# Patient Record
Sex: FEMALE | Race: WHITE | NOT HISPANIC OR LATINO | Employment: STUDENT | RURAL
[De-identification: names, ages, dates, MRNs, and addresses within clinical notes are randomized per-mention and may not be internally consistent; named-entity substitution may affect disease eponyms.]

---
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
## 2019-06-12 ENCOUNTER — OFFICE VISIT (OUTPATIENT)
Dept: OBSTETRICS AND GYNECOLOGY | Facility: CLINIC | Age: 19
End: 2019-06-12

## 2019-06-12 ENCOUNTER — APPOINTMENT (OUTPATIENT)
Dept: LAB | Facility: HOSPITAL | Age: 19
End: 2019-06-12

## 2019-06-12 VITALS
WEIGHT: 112 LBS | DIASTOLIC BLOOD PRESSURE: 62 MMHG | SYSTOLIC BLOOD PRESSURE: 112 MMHG | BODY MASS INDEX: 18.66 KG/M2 | HEIGHT: 65 IN

## 2019-06-12 DIAGNOSIS — N93.9 ABNORMAL UTERINE BLEEDING (AUB): Primary | ICD-10-CM

## 2019-06-12 PROCEDURE — 36415 COLL VENOUS BLD VENIPUNCTURE: CPT | Performed by: NURSE PRACTITIONER

## 2019-06-12 PROCEDURE — 84439 ASSAY OF FREE THYROXINE: CPT | Performed by: NURSE PRACTITIONER

## 2019-06-12 PROCEDURE — 84443 ASSAY THYROID STIM HORMONE: CPT | Performed by: NURSE PRACTITIONER

## 2019-06-12 PROCEDURE — 99203 OFFICE O/P NEW LOW 30 MIN: CPT | Performed by: NURSE PRACTITIONER

## 2019-06-12 NOTE — PROGRESS NOTES
"Paz Maldonado is a 18 y.o. 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  She has been under a lot of stress the past few months with college and trying to find a place to live with her boyfriend.  She has not experienced any further episodes of bleeding since the March episode.       Vaginal Bleeding   The patient's primary symptoms include vaginal bleeding. The patient's pertinent negatives include no genital itching, genital lesions, genital odor, genital rash, missed menses, pelvic pain or vaginal discharge. This is a new problem. The current episode started more than 1 month ago. The problem has been resolved. The patient is experiencing no pain. She is not pregnant. Pertinent negatives include no abdominal pain, anorexia, back pain, chills, constipation, diarrhea, discolored urine, dysuria, fever, flank pain, frequency, headaches, hematuria, joint pain, joint swelling, nausea, painful intercourse, rash, sore throat, urgency or vomiting. The vaginal discharge was brown, bloody and thin. She has been passing clots. She has not been passing tissue. Nothing aggravates the symptoms. She has tried NSAIDs for the symptoms. The treatment provided moderate relief. She is sexually active. No, her partner does not have an STD. She uses progestin injections for contraception. Her menstrual history has been irregular. Her past medical history is significant for menorrhagia and an STD. There is no history of an abdominal surgery, a  section, an ectopic pregnancy, endometriosis, a gynecological surgery, herpes simplex, metrorrhagia, miscarriage, ovarian cysts, perineal abscess, PID, a terminated pregnancy or vaginosis.       The following portions of the patient's history were " reviewed and updated as appropriate: allergies, current medications, past family history, past medical history, past social history, past surgical history and problem list.    Review of Systems   Constitutional: Negative for chills, fatigue, fever and unexpected weight change.   HENT: Negative for sore throat.    Respiratory: Negative for apnea, chest tightness and shortness of breath.    Cardiovascular: Negative for chest pain and palpitations.   Gastrointestinal: Negative for abdominal pain, anorexia, constipation, diarrhea, nausea and vomiting.   Genitourinary: Positive for menorrhagia. Negative for difficulty urinating, dyspareunia, dysuria, enuresis, flank pain, frequency, genital sores, hematuria, menstrual problem, missed menses, pelvic pain, urgency, vaginal bleeding, vaginal discharge and vaginal pain.   Musculoskeletal: Negative for back pain and joint pain.   Skin: Negative for rash.   Neurological: Negative for headaches.   Psychiatric/Behavioral: Negative for sleep disturbance.       Objective   Physical Exam   Constitutional: She is oriented to person, place, and time. Vital signs are normal. She appears well-developed and well-nourished. No distress.   Cardiovascular: Normal rate, regular rhythm and normal heart sounds.   Pulmonary/Chest: Effort normal and breath sounds normal.   Abdominal: Soft. Normal appearance and bowel sounds are normal. There is no tenderness.   Neurological: She is alert and oriented to person, place, and time. GCS eye subscore is 4. GCS verbal subscore is 5. GCS motor subscore is 6.   Skin: Skin is warm, dry and intact. She is not diaphoretic.   Psychiatric: She has a normal mood and affect. Her behavior is normal.   Nursing note and vitals reviewed.        Assessment/Plan   Diagnoses and all orders for this visit:    Abnormal uterine bleeding (AUB)  -     T4, Free  -     TSH  -     US Non-ob Transvaginal; Future      Labs to r/o thyroid disease.  TVUS to r/o anatomical  abnormality.  We discussed reasons for bleeding while on Depo-provera.  I think her increased stress level may have a lot to do with her bleeding while on injections.  Reviewed preliminary scan report with pt: uterus normal size, bicornuate uterus, endometrium 5.2mm, bilateral ovaries wnl with color flow present.   RTC if symptoms do not improve or worsen.

## 2019-06-13 LAB
T4 FREE SERPL-MCNC: 1.66 NG/DL (ref 0.93–1.7)
TSH SERPL DL<=0.05 MIU/L-ACNC: 1.22 MIU/ML (ref 0.27–4.2)

## 2021-10-01 ENCOUNTER — OFFICE VISIT (OUTPATIENT)
Dept: OBSTETRICS AND GYNECOLOGY | Facility: CLINIC | Age: 21
End: 2021-10-01

## 2021-10-01 VITALS
HEIGHT: 65 IN | WEIGHT: 120 LBS | DIASTOLIC BLOOD PRESSURE: 62 MMHG | BODY MASS INDEX: 19.99 KG/M2 | SYSTOLIC BLOOD PRESSURE: 100 MMHG

## 2021-10-01 DIAGNOSIS — N94.6 DYSMENORRHEA: Primary | ICD-10-CM

## 2021-10-01 DIAGNOSIS — N89.8 VAGINAL IRRITATION: ICD-10-CM

## 2021-10-01 LAB
CANDIDA ALBICANS: NEGATIVE
GARDNERELLA VAGINALIS: NEGATIVE
T VAGINALIS DNA VAG QL PROBE+SIG AMP: NEGATIVE

## 2021-10-01 PROCEDURE — 87660 TRICHOMONAS VAGIN DIR PROBE: CPT | Performed by: NURSE PRACTITIONER

## 2021-10-01 PROCEDURE — 87591 N.GONORRHOEAE DNA AMP PROB: CPT | Performed by: NURSE PRACTITIONER

## 2021-10-01 PROCEDURE — 99213 OFFICE O/P EST LOW 20 MIN: CPT | Performed by: NURSE PRACTITIONER

## 2021-10-01 PROCEDURE — 87491 CHLMYD TRACH DNA AMP PROBE: CPT | Performed by: NURSE PRACTITIONER

## 2021-10-01 PROCEDURE — 87480 CANDIDA DNA DIR PROBE: CPT | Performed by: NURSE PRACTITIONER

## 2021-10-01 PROCEDURE — 87661 TRICHOMONAS VAGINALIS AMPLIF: CPT | Performed by: NURSE PRACTITIONER

## 2021-10-01 PROCEDURE — 87510 GARDNER VAG DNA DIR PROBE: CPT | Performed by: NURSE PRACTITIONER

## 2021-10-01 RX ORDER — IBUPROFEN 800 MG/1
TABLET ORAL
Qty: 30 TABLET | Refills: 5 | Status: SHIPPED | OUTPATIENT
Start: 2021-10-01 | End: 2023-03-02

## 2021-10-01 NOTE — PROGRESS NOTES
Subjective   Jennifer Maldonado is a 21 y.o. here for painful periods    Jennifer Maldonado is a 21 yr old  who presents with concerns for painful periods. Pt has 2 uterus and 2 cervix (diagnosed at 15 yrs old) and had previously been on Depo-Provera to manage her painful periods. Pt had been off Depo-Provera since 2020 and periods are back to being very painful. Does not want to be on birth control but just wants to make sure there isn't anything else wrong. She takes midol and ibuprofen when she starts her period but sometimes does not feel the medications help. Denies pelvic pain when not on her period and denies dyspareunia. Jennifer also c/o of vaginal discharge and irritation prior to starting her period 2 days ago; wants to be swabbed. Is sexually active, uses condoms.       The following portions of the patient's history were reviewed and updated as appropriate: allergies, current medications, past family history, past medical history, past social history, past surgical history and problem list.    Review of Systems   Constitutional: Negative for chills, fatigue and fever.   Respiratory: Negative for shortness of breath.    Cardiovascular: Negative for chest pain and palpitations.   Gastrointestinal: Negative for abdominal pain, constipation, diarrhea and nausea.   Genitourinary: Positive for menstrual problem. Negative for dysuria, frequency, pelvic pressure, vaginal bleeding, vaginal discharge and vaginal pain.   Skin: Negative for rash.   Neurological: Negative for headache.   Psychiatric/Behavioral: Negative for depressed mood.       Objective   Physical Exam  Vitals and nursing note reviewed. Exam conducted with a chaperone present.   Constitutional:       Appearance: She is well-developed.   HENT:      Head: Normocephalic.   Cardiovascular:      Rate and Rhythm: Normal rate and regular rhythm.   Pulmonary:      Effort: Pulmonary effort is normal.   Genitourinary:     General: Normal vulva.      Labia:          Right: No rash, tenderness, lesion or injury.         Left: No rash, tenderness, lesion or injury.       Vagina: Normal.      Cervix: Normal.      Comments: 2 cervix visualized. Small amount of menstrual blood.  Vag panel and G/C collected.   Musculoskeletal:         General: Normal range of motion.      Cervical back: Normal range of motion.   Skin:     General: Skin is warm and dry.   Neurological:      Mental Status: She is alert and oriented to person, place, and time.   Psychiatric:         Behavior: Behavior normal.           Assessment/Plan   Diagnoses and all orders for this visit:    1. Dysmenorrhea (Primary)    2. Vaginal irritation  -     Gardnerella vaginalis, Trichomonas vaginalis, Candida albicans, DNA - Swab, Vagina  -     Chlamydia trachomatis, Neisseria gonorrhoeae, Trichomonas vaginalis, PCR - Swab, Cervix; Future  -     Chlamydia trachomatis, Neisseria gonorrhoeae, Trichomonas vaginalis, PCR - Swab, Cervix    Other orders  -     ibuprofen (ADVIL,MOTRIN) 800 MG tablet; Take 1 pill every 8 hours two days prior to start of period; continue for 2-3 days.  Dispense: 30 tablet; Refill: 5        Since patient does not want birth control to managed dysmenorrhea, discussed start taking ibuprofen 2 days prior to onset of period to better manage the pain. Take medication with food and water to avoid GI upset. Pt is agreeable to this plan. Offered pelvic ultrasound for further evaluation; pt declines at this time.  Will call pt with abnormal results of swabs.  Return for well woman/pap when not on her period.

## 2021-10-02 LAB
C TRACH RRNA CVX QL NAA+PROBE: NEGATIVE
N GONORRHOEA RRNA SPEC QL NAA+PROBE: NEGATIVE
TRICHOMONAS VAGINALIS PCR: NEGATIVE

## 2021-10-12 ENCOUNTER — OFFICE VISIT (OUTPATIENT)
Dept: OBSTETRICS AND GYNECOLOGY | Facility: CLINIC | Age: 21
End: 2021-10-12

## 2021-10-12 VITALS
WEIGHT: 120 LBS | DIASTOLIC BLOOD PRESSURE: 62 MMHG | HEIGHT: 65 IN | SYSTOLIC BLOOD PRESSURE: 94 MMHG | BODY MASS INDEX: 19.99 KG/M2

## 2021-10-12 DIAGNOSIS — N89.8 VAGINAL ODOR: ICD-10-CM

## 2021-10-12 DIAGNOSIS — Z01.419 WELL WOMAN EXAM WITH ROUTINE GYNECOLOGICAL EXAM: Primary | ICD-10-CM

## 2021-10-12 DIAGNOSIS — Z12.4 ENCOUNTER FOR PAPANICOLAOU SMEAR FOR CERVICAL CANCER SCREENING: ICD-10-CM

## 2021-10-12 PROCEDURE — 87510 GARDNER VAG DNA DIR PROBE: CPT | Performed by: NURSE PRACTITIONER

## 2021-10-12 PROCEDURE — 87480 CANDIDA DNA DIR PROBE: CPT | Performed by: NURSE PRACTITIONER

## 2021-10-12 PROCEDURE — 87660 TRICHOMONAS VAGIN DIR PROBE: CPT | Performed by: NURSE PRACTITIONER

## 2021-10-12 PROCEDURE — 99395 PREV VISIT EST AGE 18-39: CPT | Performed by: NURSE PRACTITIONER

## 2021-10-12 NOTE — PROGRESS NOTES
Subjective   Jennifer Maldonado is a 21 y.o. [facility] well woman visit    Jennifer Maldonado is a 21 yr old  premenopasual female, diagnosed with 2 uterus and 2 cervix at age 15. C/o of vaginal odor that is unresolved from her last visit. LMP 2021.     Gynecologic Exam  The patient's primary symptoms include a genital odor. The patient's pertinent negatives include no genital itching, genital lesions, genital rash, missed menses, pelvic pain, vaginal bleeding or vaginal discharge. This is a recurrent problem. The current episode started in the past 7 days. Pertinent negatives include no abdominal pain, chills, constipation, diarrhea, dysuria, fever, frequency, nausea, rash or sore throat. She is sexually active. No, her partner does not have an STD. She uses nothing for contraception. Her menstrual history has been regular.       The following portions of the patient's history were reviewed and updated as appropriate: allergies, current medications, past family history, past medical history, past social history, past surgical history and problem list.    Review of Systems   Constitutional: Negative for chills, fatigue, fever, unexpected weight gain and unexpected weight loss.   HENT: Negative for sneezing and sore throat.    Respiratory: Negative for shortness of breath.    Cardiovascular: Negative for chest pain and palpitations.   Gastrointestinal: Negative for abdominal pain, constipation, diarrhea and nausea.   Endocrine: Negative for cold intolerance and heat intolerance.   Genitourinary: Negative for amenorrhea, breast discharge, breast lump, breast pain, difficulty urinating, dysuria, frequency, menstrual problem, missed menses, pelvic pain, pelvic pressure, urinary incontinence, vaginal bleeding, vaginal discharge and vaginal pain.        Vaginal odor   Skin: Negative for rash.   Neurological: Negative for weakness and headache.   Psychiatric/Behavioral: Negative for sleep disturbance, depressed mood and  stress.       Objective   Physical Exam  Vitals and nursing note reviewed. Exam conducted with a chaperone present.   Constitutional:       Appearance: She is well-developed.   HENT:      Head: Normocephalic and atraumatic.   Eyes:      Conjunctiva/sclera: Conjunctivae normal.   Neck:      Thyroid: No thyromegaly.   Cardiovascular:      Rate and Rhythm: Normal rate and regular rhythm.      Heart sounds: Normal heart sounds.   Pulmonary:      Effort: Pulmonary effort is normal. No respiratory distress.      Breath sounds: Normal breath sounds.   Chest:   Breasts:      Right: Normal.      Left: Normal.        Comments: Bilateral nipple piercings.   Abdominal:      General: Bowel sounds are normal. There is no distension.      Palpations: Abdomen is soft.      Tenderness: There is no abdominal tenderness.   Genitourinary:     General: Normal vulva.      Labia:         Right: No rash, tenderness or lesion.         Left: No rash, tenderness, lesion or injury.       Vagina: Normal.      Cervix: Normal.      Rectum: Normal.      Comments: Pt has 2 cervix; left cervix appear to be smaller. Pap smears collected and labeled appropriately.     Vag panel collected.   Musculoskeletal:         General: Normal range of motion.      Cervical back: Normal range of motion and neck supple.   Skin:     General: Skin is warm and dry.   Neurological:      Mental Status: She is alert and oriented to person, place, and time.   Psychiatric:         Behavior: Behavior normal.           Assessment/Plan   Diagnoses and all orders for this visit:    1. Well woman exam with routine gynecological exam (Primary)    2. Encounter for Papanicolaou smear for cervical cancer screening  -     Liquid-based Pap Smear, Screening    3. Vaginal odor  -     Gardnerella vaginalis, Trichomonas vaginalis, Candida albicans, DNA - Swab, Vagina        Reviewed pap smear screening guidelines, breast awareness. Discussed that she does not have excessive vaginal  discharge or odor; will call if vag panel shows anything.  Briefly reviewed BC options to include pills, patch, and Depo-Provera for suppression of period as pt has dysmenorrhea; pt declines again at this time. Return in one year or as needed.

## 2021-10-14 ENCOUNTER — TELEPHONE (OUTPATIENT)
Dept: OBSTETRICS AND GYNECOLOGY | Facility: CLINIC | Age: 21
End: 2021-10-14

## 2021-10-14 LAB
LAB AP CASE REPORT: NORMAL
LAB AP CASE REPORT: NORMAL
PATH INTERP SPEC-IMP: NORMAL
PATH INTERP SPEC-IMP: NORMAL

## 2021-10-14 RX ORDER — FLUCONAZOLE 150 MG/1
150 TABLET ORAL DAILY
Qty: 1 TABLET | Refills: 0 | Status: SHIPPED | OUTPATIENT
Start: 2021-10-14 | End: 2023-03-02

## 2021-10-14 NOTE — TELEPHONE ENCOUNTER
----- Message from MOHINI Castano sent at 10/14/2021 12:25 PM CDT -----  Please let her know that her pap smears came back normal. One said she had yeast; if she wants treatment for this please order Diflucan one time dose. Thank you.

## 2023-03-02 ENCOUNTER — LAB (OUTPATIENT)
Dept: LAB | Facility: HOSPITAL | Age: 23
End: 2023-03-02
Payer: COMMERCIAL

## 2023-03-02 ENCOUNTER — OFFICE VISIT (OUTPATIENT)
Dept: FAMILY MEDICINE CLINIC | Facility: CLINIC | Age: 23
End: 2023-03-02
Payer: COMMERCIAL

## 2023-03-02 VITALS
WEIGHT: 108 LBS | SYSTOLIC BLOOD PRESSURE: 128 MMHG | HEART RATE: 93 BPM | OXYGEN SATURATION: 100 % | HEIGHT: 65 IN | BODY MASS INDEX: 17.99 KG/M2 | DIASTOLIC BLOOD PRESSURE: 56 MMHG

## 2023-03-02 DIAGNOSIS — N63.0 BREAST MASS IN FEMALE: ICD-10-CM

## 2023-03-02 DIAGNOSIS — N64.4 BREAST TENDERNESS IN FEMALE: ICD-10-CM

## 2023-03-02 DIAGNOSIS — Z00.00 ANNUAL PHYSICAL EXAM: ICD-10-CM

## 2023-03-02 DIAGNOSIS — Z00.00 ANNUAL PHYSICAL EXAM: Primary | ICD-10-CM

## 2023-03-02 DIAGNOSIS — Z76.89 ENCOUNTER TO ESTABLISH CARE: ICD-10-CM

## 2023-03-02 LAB
ALBUMIN SERPL-MCNC: 4.6 G/DL (ref 3.5–5.2)
ALBUMIN/GLOB SERPL: 1.5 G/DL
ALP SERPL-CCNC: 64 U/L (ref 39–117)
ALT SERPL W P-5'-P-CCNC: 9 U/L (ref 1–33)
ANION GAP SERPL CALCULATED.3IONS-SCNC: 8.8 MMOL/L (ref 5–15)
AST SERPL-CCNC: 13 U/L (ref 1–32)
BASOPHILS # BLD AUTO: 0.06 10*3/MM3 (ref 0–0.2)
BASOPHILS NFR BLD AUTO: 0.9 % (ref 0–1.5)
BILIRUB SERPL-MCNC: 0.3 MG/DL (ref 0–1.2)
BUN SERPL-MCNC: 7 MG/DL (ref 6–20)
BUN/CREAT SERPL: 9.7 (ref 7–25)
CALCIUM SPEC-SCNC: 9.4 MG/DL (ref 8.6–10.5)
CHLORIDE SERPL-SCNC: 103 MMOL/L (ref 98–107)
CO2 SERPL-SCNC: 26.2 MMOL/L (ref 22–29)
CREAT SERPL-MCNC: 0.72 MG/DL (ref 0.57–1)
DEPRECATED RDW RBC AUTO: 41 FL (ref 37–54)
EGFRCR SERPLBLD CKD-EPI 2021: 120.7 ML/MIN/1.73
EOSINOPHIL # BLD AUTO: 0.08 10*3/MM3 (ref 0–0.4)
EOSINOPHIL NFR BLD AUTO: 1.2 % (ref 0.3–6.2)
ERYTHROCYTE [DISTWIDTH] IN BLOOD BY AUTOMATED COUNT: 12.7 % (ref 12.3–15.4)
GLOBULIN UR ELPH-MCNC: 3 GM/DL
GLUCOSE SERPL-MCNC: 81 MG/DL (ref 65–99)
HBA1C MFR BLD: 5 % (ref 4.8–5.6)
HCT VFR BLD AUTO: 40.1 % (ref 34–46.6)
HCV AB SER DONR QL: NORMAL
HGB BLD-MCNC: 14 G/DL (ref 12–15.9)
IMM GRANULOCYTES # BLD AUTO: 0.02 10*3/MM3 (ref 0–0.05)
IMM GRANULOCYTES NFR BLD AUTO: 0.3 % (ref 0–0.5)
LYMPHOCYTES # BLD AUTO: 1.96 10*3/MM3 (ref 0.7–3.1)
LYMPHOCYTES NFR BLD AUTO: 28.9 % (ref 19.6–45.3)
MCH RBC QN AUTO: 30.9 PG (ref 26.6–33)
MCHC RBC AUTO-ENTMCNC: 34.9 G/DL (ref 31.5–35.7)
MCV RBC AUTO: 88.5 FL (ref 79–97)
MONOCYTES # BLD AUTO: 0.51 10*3/MM3 (ref 0.1–0.9)
MONOCYTES NFR BLD AUTO: 7.5 % (ref 5–12)
NEUTROPHILS NFR BLD AUTO: 4.15 10*3/MM3 (ref 1.7–7)
NEUTROPHILS NFR BLD AUTO: 61.2 % (ref 42.7–76)
NRBC BLD AUTO-RTO: 0 /100 WBC (ref 0–0.2)
PLATELET # BLD AUTO: 307 10*3/MM3 (ref 140–450)
PMV BLD AUTO: 11.1 FL (ref 6–12)
POTASSIUM SERPL-SCNC: 4.1 MMOL/L (ref 3.5–5.2)
PROT SERPL-MCNC: 7.6 G/DL (ref 6–8.5)
RBC # BLD AUTO: 4.53 10*6/MM3 (ref 3.77–5.28)
SODIUM SERPL-SCNC: 138 MMOL/L (ref 136–145)
TSH SERPL DL<=0.05 MIU/L-ACNC: 1.43 UIU/ML (ref 0.27–4.2)
VIT B12 BLD-MCNC: 438 PG/ML (ref 211–946)
WBC NRBC COR # BLD: 6.78 10*3/MM3 (ref 3.4–10.8)

## 2023-03-02 PROCEDURE — 36415 COLL VENOUS BLD VENIPUNCTURE: CPT

## 2023-03-02 PROCEDURE — 86803 HEPATITIS C AB TEST: CPT

## 2023-03-02 PROCEDURE — 83036 HEMOGLOBIN GLYCOSYLATED A1C: CPT

## 2023-03-02 PROCEDURE — 99395 PREV VISIT EST AGE 18-39: CPT | Performed by: NURSE PRACTITIONER

## 2023-03-02 PROCEDURE — 80050 GENERAL HEALTH PANEL: CPT

## 2023-03-02 PROCEDURE — 82607 VITAMIN B-12: CPT

## 2023-03-02 NOTE — PROGRESS NOTES
"Chief Complaint  Establish Care and Pain (Right breast )    Subjective          Jennifer Maldonado presents to [facility] - [place] encounter to establish care.   Had a mammogram last year which read abnormal and feels like she needs an ultrasound. She was told by her last PCP that she didn't need an US.   Breast Pain  This is a new problem. The current episode started 1 to 4 weeks ago. The problem has been unchanged. Associated symptoms comments: Right lump.     Outpatient Medications Prior to Visit   Medication Sig Dispense Refill   • fluconazole (Diflucan) 150 MG tablet Take 1 tablet by mouth Daily. 1 tablet 0   • ibuprofen (ADVIL,MOTRIN) 800 MG tablet Take 1 pill every 8 hours two days prior to start of period; continue for 2-3 days. 30 tablet 5     No facility-administered medications prior to visit.       Review of Systems      Objective   Vital Signs:   Visit Vitals  /56 (BP Location: Left arm, Patient Position: Sitting, Cuff Size: Adult)   Pulse 93   Ht 165.1 cm (65\")   Wt 49 kg (108 lb)   LMP 02/06/2023 (Exact Date)   SpO2 100%   BMI 17.97 kg/m²     Physical Exam  Vitals and nursing note reviewed.   Constitutional:       Appearance: She is well-developed.   HENT:      Head: Normocephalic and atraumatic.   Eyes:      General: Lids are normal.      Conjunctiva/sclera: Conjunctivae normal.   Neck:      Thyroid: No thyroid mass or thyromegaly.      Trachea: Trachea normal. No tracheal tenderness.   Cardiovascular:      Rate and Rhythm: Normal rate.      Pulses: Normal pulses.      Heart sounds: Normal heart sounds.   Pulmonary:      Effort: Pulmonary effort is normal. No respiratory distress.      Breath sounds: Normal breath sounds. No wheezing.   Chest:   Breasts:     Right: Tenderness present.       Abdominal:      General: There is no distension.      Palpations: Abdomen is soft. There is no mass.   Musculoskeletal:         General: Normal range of motion.      " Cervical back: Normal range of motion. No edema.   Skin:     General: Skin is warm and dry.      Coloration: Skin is not pale.      Findings: No abrasion, erythema or lesion.   Neurological:      Mental Status: She is alert and oriented to person, place, and time.   Psychiatric:         Mood and Affect: Mood is anxious. Affect is not inappropriate.         Speech: Speech normal.         Behavior: Behavior normal.         Thought Content: Thought content normal.         Judgment: Judgment normal. Judgment is not impulsive.        Result Review :                 Assessment and Plan    Diagnoses and all orders for this visit:    1. Annual physical exam (Primary)  -     TSH; Future  -     Comprehensive Metabolic Panel; Future  -     Hemoglobin A1c; Future  -     CBC & Differential; Future  -     Vitamin B12; Future  -     Hepatitis C Antibody; Future    2. Encounter to establish care  -     TSH; Future  -     Comprehensive Metabolic Panel; Future  -     Hemoglobin A1c; Future  -     CBC & Differential; Future  -     Vitamin B12; Future  -     Hepatitis C Antibody; Future    3. Breast tenderness in female  -     US breast right complete; Future  -     TSH; Future  -     Comprehensive Metabolic Panel; Future  -     Hemoglobin A1c; Future  -     CBC & Differential; Future  -     Vitamin B12; Future  -     Hepatitis C Antibody; Future    4. Breast mass in female  -     US breast right complete; Future  -     TSH; Future  -     Comprehensive Metabolic Panel; Future  -     Hemoglobin A1c; Future  -     CBC & Differential; Future  -     Vitamin B12; Future  -     Hepatitis C Antibody; Future      Complete ordered lab work   We will call with results  Pending lab results we will discuss further treatment plan and monitoring     US ordered- they will call to schedule   We will call with results    Please call the office if you have any issues       Follow Up   Return in about 1 year (around 3/2/2024), or if symptoms worsen or  fail to improve, for Annual physical.  Patient was given instructions and counseling regarding her condition or for health maintenance advice. Please see specific information pulled into the AVS if appropriate.           This document has been electronically signed by MOHINI Aguilar on March 3, 2023 09:30 CST

## 2023-03-20 ENCOUNTER — TELEPHONE (OUTPATIENT)
Dept: OBGYN CLINIC | Age: 23
End: 2023-03-20

## 2023-03-20 NOTE — TELEPHONE ENCOUNTER
Patient is scheduled for a new patient pregnancy confirmation on 3/31. Patient stated she was experiencing cramping and spotting. Informed patientthat per Kimberlee Fisher it was recommended she be evaluated in ER or by PCP since we had not established care yet.     TONIA Angel

## 2023-03-24 ENCOUNTER — TELEPHONE (OUTPATIENT)
Dept: FAMILY MEDICINE CLINIC | Facility: CLINIC | Age: 23
End: 2023-03-24
Payer: COMMERCIAL

## 2023-03-24 ENCOUNTER — OFFICE VISIT (OUTPATIENT)
Dept: SURGERY | Facility: CLINIC | Age: 23
End: 2023-03-24
Payer: COMMERCIAL

## 2023-03-24 VITALS
HEIGHT: 65 IN | HEART RATE: 66 BPM | SYSTOLIC BLOOD PRESSURE: 110 MMHG | TEMPERATURE: 96.8 F | DIASTOLIC BLOOD PRESSURE: 60 MMHG | BODY MASS INDEX: 18.83 KG/M2 | WEIGHT: 113 LBS

## 2023-03-24 DIAGNOSIS — N63.11 MASS OF UPPER OUTER QUADRANT OF RIGHT BREAST: Primary | ICD-10-CM

## 2023-03-24 PROCEDURE — 99204 OFFICE O/P NEW MOD 45 MIN: CPT | Performed by: SURGERY

## 2023-03-24 RX ORDER — DIAZEPAM 5 MG/1
5 TABLET ORAL ONCE
Status: CANCELLED | OUTPATIENT
Start: 2023-03-24 | End: 2023-03-24

## 2023-03-24 RX ORDER — LIDOCAINE HYDROCHLORIDE AND EPINEPHRINE 10; 10 MG/ML; UG/ML
20 INJECTION, SOLUTION INFILTRATION; PERINEURAL ONCE
Status: CANCELLED | OUTPATIENT
Start: 2023-03-24 | End: 2023-03-24

## 2023-03-24 NOTE — PROGRESS NOTES
Paz Maldonado is a 22 y.o. female     Chief Complaint: Right breast mass    History of Present Illness presents with a palpable mass in the 12 o'clock position in the right breast.  Patient initially noticed this mass which she describes as pea-sized back in August of last year.  She states that she had a mammogram done at [facility] and they told her breast was very dense and she need to have further studies done.  That report is not available to us or the films at this time.  Patient recently found out she was pregnant about 3 weeks ago on a home pregnancy test.  She has not confirmed the pregnancy as of yet but she does note that her breasts seem to be swelling more.  She has no complaints about her breast.  This pea-sized mass is unchanged and she had an ultrasound done this morning which demonstrated an 8 x 15 x 13 mm solid mass at the palpable abnormality.  No family history of breast cancer no history of breast trauma.  No nipple discharge.  Mildly tender at the site but no other symptoms    Review of Systems   Constitutional: Negative.    Eyes: Negative.    Respiratory: Negative.    Cardiovascular: Negative.    Gastrointestinal: Negative.    Endocrine: Negative.    Genitourinary: Negative.    Musculoskeletal: Negative.    Skin: Negative.    Allergic/Immunologic: Negative.    Neurological: Positive for headaches.   Hematological: Negative.    Psychiatric/Behavioral: Negative.      Past Medical History:   Diagnosis Date   • Headache      No past surgical history on file.  Family History   Problem Relation Age of Onset   • No Known Problems Mother    • Heart failure Father    • Hyperlipidemia Father    • Hypertension Father    • No Known Problems Sister    • No Known Problems Brother    • COPD Maternal Grandmother    • No Known Problems Maternal Grandfather    • Cancer Paternal Grandmother    • Hyperlipidemia Paternal Grandfather    • Hypertension Paternal Grandfather    • No Known Problems  Brother      Social History     Socioeconomic History   • Marital status: Single   Tobacco Use   • Smoking status: Never   • Smokeless tobacco: Never   Vaping Use   • Vaping Use: Former   • Quit date: 3/3/2023   • Substances: Nicotine   • Devices: Disposable   Substance and Sexual Activity   • Alcohol use: No   • Drug use: No   • Sexual activity: Defer     No Known Allergies  Vitals:    03/24/23 1320   BP: 110/60   Pulse: 66   Temp: 96.8 °F (36 °C)       Home Medications:  Prior to Admission medications    Not on File       Objective   Physical Exam  Constitutional:       General: She is not in acute distress.     Appearance: She is well-developed.   HENT:      Head: Normocephalic and atraumatic.   Eyes:      General: No scleral icterus.     Conjunctiva/sclera: Conjunctivae normal.   Neck:      Thyroid: No thyromegaly.      Trachea: No tracheal deviation.   Cardiovascular:      Rate and Rhythm: Normal rate and regular rhythm.      Heart sounds: Normal heart sounds. No murmur heard.    No friction rub. No gallop.   Pulmonary:      Effort: Pulmonary effort is normal. No respiratory distress.      Breath sounds: Normal breath sounds. No stridor. No wheezing or rales.   Chest:      Chest wall: No tenderness.       Abdominal:      General: Bowel sounds are normal. There is no distension.      Palpations: Abdomen is soft. There is no mass.      Tenderness: There is no abdominal tenderness. There is no guarding or rebound.      Hernia: No hernia is present.   Musculoskeletal:         General: No deformity. Normal range of motion.      Cervical back: Normal range of motion and neck supple.   Lymphadenopathy:      Cervical: No cervical adenopathy.   Skin:     General: Skin is warm and dry.      Coloration: Skin is not pale.      Findings: No erythema or rash.      Nails: There is no clubbing.   Neurological:      Mental Status: She is alert and oriented to person, place, and time.   Psychiatric:         Behavior: Behavior  normal.         Thought Content: Thought content normal.     Reviewed ultrasound with the patient answered all of her questions    Assessment & Plan Based upon her history this is probably a benign finding most likely some type of fibroadenoma and I explained that to her.  I would recommend biopsy though to confirm this but she clearly understands her options.  Biopsy can be done under just local.  No other medication to be given and I think that would be safe for both her and her baby.  She is going to do her OB care in [place].  We will set her up for a right breast ultrasound mammotome biopsy and leave a clip.  I fully discussed the procedure alternatives risk and benefits with her.  We will not give her Valium.      There were no encounter diagnoses.                     This document has been electronically signed by Manjula Diaz CSA on March 24, 2023 13:26 CDT

## 2023-03-24 NOTE — TELEPHONE ENCOUNTER
Della with [facility] called needing conformation that the patients breast ultarsound results were received. Please call [phone]

## 2023-03-24 NOTE — H&P (VIEW-ONLY)
Paz Maldonado is a 22 y.o. female     Chief Complaint: Right breast mass    History of Present Illness presents with a palpable mass in the 12 o'clock position in the right breast.  Patient initially noticed this mass which she describes as pea-sized back in August of last year.  She states that she had a mammogram done at [facility] and they told her breast was very dense and she need to have further studies done.  That report is not available to us or the films at this time.  Patient recently found out she was pregnant about 3 weeks ago on a home pregnancy test.  She has not confirmed the pregnancy as of yet but she does note that her breasts seem to be swelling more.  She has no complaints about her breast.  This pea-sized mass is unchanged and she had an ultrasound done this morning which demonstrated an 8 x 15 x 13 mm solid mass at the palpable abnormality.  No family history of breast cancer no history of breast trauma.  No nipple discharge.  Mildly tender at the site but no other symptoms    Review of Systems   Constitutional: Negative.    Eyes: Negative.    Respiratory: Negative.    Cardiovascular: Negative.    Gastrointestinal: Negative.    Endocrine: Negative.    Genitourinary: Negative.    Musculoskeletal: Negative.    Skin: Negative.    Allergic/Immunologic: Negative.    Neurological: Positive for headaches.   Hematological: Negative.    Psychiatric/Behavioral: Negative.      Past Medical History:   Diagnosis Date   • Headache      No past surgical history on file.  Family History   Problem Relation Age of Onset   • No Known Problems Mother    • Heart failure Father    • Hyperlipidemia Father    • Hypertension Father    • No Known Problems Sister    • No Known Problems Brother    • COPD Maternal Grandmother    • No Known Problems Maternal Grandfather    • Cancer Paternal Grandmother    • Hyperlipidemia Paternal Grandfather    • Hypertension Paternal Grandfather    • No Known Problems  Brother      Social History     Socioeconomic History   • Marital status: Single   Tobacco Use   • Smoking status: Never   • Smokeless tobacco: Never   Vaping Use   • Vaping Use: Former   • Quit date: 3/3/2023   • Substances: Nicotine   • Devices: Disposable   Substance and Sexual Activity   • Alcohol use: No   • Drug use: No   • Sexual activity: Defer     No Known Allergies  Vitals:    03/24/23 1320   BP: 110/60   Pulse: 66   Temp: 96.8 °F (36 °C)       Home Medications:  Prior to Admission medications    Not on File       Objective   Physical Exam  Constitutional:       General: She is not in acute distress.     Appearance: She is well-developed.   HENT:      Head: Normocephalic and atraumatic.   Eyes:      General: No scleral icterus.     Conjunctiva/sclera: Conjunctivae normal.   Neck:      Thyroid: No thyromegaly.      Trachea: No tracheal deviation.   Cardiovascular:      Rate and Rhythm: Normal rate and regular rhythm.      Heart sounds: Normal heart sounds. No murmur heard.    No friction rub. No gallop.   Pulmonary:      Effort: Pulmonary effort is normal. No respiratory distress.      Breath sounds: Normal breath sounds. No stridor. No wheezing or rales.   Chest:      Chest wall: No tenderness.       Abdominal:      General: Bowel sounds are normal. There is no distension.      Palpations: Abdomen is soft. There is no mass.      Tenderness: There is no abdominal tenderness. There is no guarding or rebound.      Hernia: No hernia is present.   Musculoskeletal:         General: No deformity. Normal range of motion.      Cervical back: Normal range of motion and neck supple.   Lymphadenopathy:      Cervical: No cervical adenopathy.   Skin:     General: Skin is warm and dry.      Coloration: Skin is not pale.      Findings: No erythema or rash.      Nails: There is no clubbing.   Neurological:      Mental Status: She is alert and oriented to person, place, and time.   Psychiatric:         Behavior: Behavior  normal.         Thought Content: Thought content normal.     Reviewed ultrasound with the patient answered all of her questions    Assessment & Plan Based upon her history this is probably a benign finding most likely some type of fibroadenoma and I explained that to her.  I would recommend biopsy though to confirm this but she clearly understands her options.  Biopsy can be done under just local.  No other medication to be given and I think that would be safe for both her and her baby.  She is going to do her OB care in [place].  We will set her up for a right breast ultrasound mammotome biopsy and leave a clip.  I fully discussed the procedure alternatives risk and benefits with her.  We will not give her Valium.      There were no encounter diagnoses.                     This document has been electronically signed by Manjula Diaz CSA on March 24, 2023 13:26 CDT

## 2023-03-29 ENCOUNTER — HOSPITAL ENCOUNTER (OUTPATIENT)
Dept: ULTRASOUND IMAGING | Facility: HOSPITAL | Age: 23
Discharge: HOME | End: 2023-03-29
Admitting: SURGERY
Payer: COMMERCIAL

## 2023-03-29 VITALS
HEART RATE: 67 BPM | WEIGHT: 112.43 LBS | BODY MASS INDEX: 18.73 KG/M2 | RESPIRATION RATE: 16 BRPM | DIASTOLIC BLOOD PRESSURE: 68 MMHG | OXYGEN SATURATION: 98 % | HEIGHT: 65 IN | SYSTOLIC BLOOD PRESSURE: 110 MMHG | TEMPERATURE: 97 F

## 2023-03-29 DIAGNOSIS — N63.11 MASS OF UPPER OUTER QUADRANT OF RIGHT BREAST: ICD-10-CM

## 2023-03-29 PROCEDURE — 19083 BX BREAST 1ST LESION US IMAG: CPT | Performed by: SURGERY

## 2023-03-29 PROCEDURE — A4648 IMPLANTABLE TISSUE MARKER: HCPCS

## 2023-03-29 RX ORDER — ACETAMINOPHEN 325 MG/1
650 TABLET ORAL ONCE
Status: COMPLETED | OUTPATIENT
Start: 2023-03-29 | End: 2023-03-29

## 2023-03-29 RX ORDER — LIDOCAINE HYDROCHLORIDE AND EPINEPHRINE 10; 10 MG/ML; UG/ML
20 INJECTION, SOLUTION INFILTRATION; PERINEURAL ONCE
Status: COMPLETED | OUTPATIENT
Start: 2023-03-29 | End: 2023-03-29

## 2023-03-29 RX ADMIN — LIDOCAINE HYDROCHLORIDE AND EPINEPHRINE 11 ML: 10; 10 INJECTION, SOLUTION INFILTRATION; PERINEURAL at 08:32

## 2023-03-29 RX ADMIN — ACETAMINOPHEN 650 MG: 325 TABLET ORAL at 08:51

## 2023-03-30 LAB — REF LAB TEST METHOD: NORMAL

## 2023-03-31 ENCOUNTER — TELEPHONE (OUTPATIENT)
Dept: SURGERY | Facility: CLINIC | Age: 23
End: 2023-03-31
Payer: COMMERCIAL

## 2023-03-31 ENCOUNTER — OFFICE VISIT (OUTPATIENT)
Dept: OBGYN CLINIC | Age: 23
End: 2023-03-31

## 2023-03-31 VITALS
HEART RATE: 82 BPM | HEIGHT: 65 IN | DIASTOLIC BLOOD PRESSURE: 69 MMHG | BODY MASS INDEX: 18.99 KG/M2 | SYSTOLIC BLOOD PRESSURE: 107 MMHG | WEIGHT: 114 LBS

## 2023-03-31 DIAGNOSIS — N91.2 AMENORRHEA: ICD-10-CM

## 2023-03-31 DIAGNOSIS — Q51.3 BICORNUATE UTERUS: ICD-10-CM

## 2023-03-31 DIAGNOSIS — Z36.89 CONFIRM FETAL CARDIAC ACTIVITY USING ULTRASOUND: ICD-10-CM

## 2023-03-31 DIAGNOSIS — Z32.00 POSSIBLE PREGNANCY: ICD-10-CM

## 2023-03-31 DIAGNOSIS — Z76.89 ENCOUNTER TO ESTABLISH CARE: Primary | ICD-10-CM

## 2023-03-31 DIAGNOSIS — O21.9 NAUSEA AND VOMITING DURING PREGNANCY: ICD-10-CM

## 2023-03-31 DIAGNOSIS — Z12.4 SCREENING FOR CERVICAL CANCER: ICD-10-CM

## 2023-03-31 LAB
CONTROL: PRESENT
GONADOTROPIN, CHORIONIC (HCG) QUANT: ABNORMAL MIU/ML (ref 0–5.3)
PREGNANCY TEST URINE, POC: ABNORMAL
PROGEST SERPL-MCNC: 14.79 NG/ML

## 2023-03-31 RX ORDER — ONDANSETRON 4 MG/1
4 TABLET, ORALLY DISINTEGRATING ORAL 3 TIMES DAILY PRN
Qty: 30 TABLET | Refills: 2 | Status: SHIPPED | OUTPATIENT
Start: 2023-03-31

## 2023-03-31 RX ORDER — PROMETHAZINE HYDROCHLORIDE 25 MG/1
25 TABLET ORAL 4 TIMES DAILY PRN
Qty: 30 TABLET | Refills: 2 | Status: SHIPPED | OUTPATIENT
Start: 2023-03-31 | End: 2023-04-10

## 2023-03-31 ASSESSMENT — ENCOUNTER SYMPTOMS
DIARRHEA: 0
ALLERGIC/IMMUNOLOGIC NEGATIVE: 1
RESPIRATORY NEGATIVE: 1
NAUSEA: 1
EYES NEGATIVE: 1
VOMITING: 1
CONSTIPATION: 0

## 2023-03-31 NOTE — PROGRESS NOTES
Pt is here to establish care and for confirmation. She states that she has a little cramping on and off spotting stopped a week ago and was random.

## 2023-03-31 NOTE — PROGRESS NOTES
Berkley Dasilva is a 20 y.o. female who presents today for her medical conditions/ complaints as noted below. Berkley Dasilva is c/o of Establish Care and Confirmation        HPI  New pt presents to establish care. Due for pap smear. +UPT at home and in office. Unsure about LMP, possibly 7-8 weeks by LMP. Taking PNV. Having some NV. Denies any bleeding or cramping. First baby and excited. Had breast biopsy in [address] [place] [address] 2 days ago- records in care everywhere- benign fibroadenoma. Cut out vaping and THC use when she found out she was pregnant. Pt reports history of bicornuate uterus from u/s in 2019- records in care everywhere. Patient's last menstrual period was 2023.     Past Medical History:   Diagnosis Date    Bicornuate uterus      Past Surgical History:   Procedure Laterality Date    BREAST BIOPSY Right      History reviewed. No pertinent family history. Social History     Tobacco Use    Smoking status: Former     Types: Cigarettes    Smokeless tobacco: Former   Substance Use Topics    Alcohol use: Yes     Comment: occ       Current Outpatient Medications   Medication Sig Dispense Refill    ondansetron (ZOFRAN-ODT) 4 MG disintegrating tablet Take 1 tablet by mouth 3 times daily as needed for Nausea or Vomiting 30 tablet 2    promethazine (PHENERGAN) 25 MG tablet Take 1 tablet by mouth 4 times daily as needed for Nausea 30 tablet 2     No current facility-administered medications for this visit. No Known Allergies  Vitals:    23 1025   BP: 107/69   Pulse: 82     Body mass index is 18.97 kg/m². Review of Systems   Constitutional: Negative. HENT: Negative. Eyes: Negative. Respiratory: Negative. Cardiovascular: Negative. Gastrointestinal:  Positive for nausea and vomiting. Negative for constipation and diarrhea. Endocrine: Negative. Genitourinary:  Positive for menstrual problem (missed x1). Negative for frequency and urgency. Musculoskeletal: Negative.     Skin:

## 2023-03-31 NOTE — TELEPHONE ENCOUNTER
Spoke with the patient by phone after recent ultrasound mammotome biopsy demonstrated a fibroadenoma.  Went over pathology with her answered all of her questions.  She will call office Monday and reschedule follow-up with me for 1 to 2 months from now.  Patient is approximately 4 weeks pregnant currently so we will not get a mammogram in 3 months.  She understands her options and does not wish to see me Monday at this point and agrees to see me in a few months just for follow-up.

## 2023-04-03 ENCOUNTER — OFFICE VISIT (OUTPATIENT)
Dept: SURGERY | Facility: CLINIC | Age: 23
End: 2023-04-03
Payer: COMMERCIAL

## 2023-04-03 VITALS
WEIGHT: 111 LBS | DIASTOLIC BLOOD PRESSURE: 58 MMHG | BODY MASS INDEX: 18.49 KG/M2 | HEIGHT: 65 IN | HEART RATE: 64 BPM | TEMPERATURE: 96.8 F | SYSTOLIC BLOOD PRESSURE: 100 MMHG

## 2023-04-03 DIAGNOSIS — D24.1 FIBROADENOMA OF RIGHT BREAST: Primary | ICD-10-CM

## 2023-04-03 PROBLEM — N63.11 MASS OF UPPER OUTER QUADRANT OF RIGHT BREAST: Status: RESOLVED | Noted: 2023-03-24 | Resolved: 2023-04-03

## 2023-04-03 PROCEDURE — 99212 OFFICE O/P EST SF 10 MIN: CPT | Performed by: SURGERY

## 2023-04-03 RX ORDER — PROMETHAZINE HYDROCHLORIDE 25 MG/1
TABLET ORAL
COMMUNITY
Start: 2023-03-31

## 2023-04-03 RX ORDER — ONDANSETRON 4 MG/1
4 TABLET, ORALLY DISINTEGRATING ORAL
COMMUNITY
Start: 2023-03-31

## 2023-04-03 NOTE — PROGRESS NOTES
22-year-old female who is now less than a week out from a right breast ultrasound mammotome biopsy for a palpable mass.  Pathology was a fibroadenoma.  This is in concordance with her clinical findings.  Patient is also 4 to 5 weeks pregnant with her first baby.  She did see OB folks this past Friday when it is hooked up with a system.  Went over pathology with her answered all of her questions.  Patient does describe some intermittent pain posterior to the biopsy site and it radiates up into her armpit intermittently.  On exam her incision is clean and intact there is no redness.  Slight amount of bruising noted.  No obvious mass appreciated no swelling in the axilla or the arm.  Suspect this may be related to the biopsy but would not recommend any intervention at this point and would expected to get better with time.  Recommend Tylenol as needed since she is pregnant.  Patient will follow-up with us after her baby delivers or sooner if she were to get worse or have any other concerns or questions.  She clearly understands that she does not have to have this palpable lesion in her right breast removed as this is a fibroadenoma.  Clearly would not do anything elective or similar electively until she after she delivers at this point.

## 2023-04-17 ENCOUNTER — HOSPITAL ENCOUNTER (OUTPATIENT)
Dept: ULTRASOUND IMAGING | Age: 23
Discharge: HOME | End: 2023-04-17
Payer: COMMERCIAL

## 2023-04-17 ENCOUNTER — INITIAL PRENATAL (OUTPATIENT)
Dept: OBGYN CLINIC | Age: 23
End: 2023-04-17

## 2023-04-17 VITALS
HEART RATE: 81 BPM | BODY MASS INDEX: 18.97 KG/M2 | SYSTOLIC BLOOD PRESSURE: 102 MMHG | WEIGHT: 114 LBS | DIASTOLIC BLOOD PRESSURE: 54 MMHG

## 2023-04-17 DIAGNOSIS — Z36.89 CONFIRM FETAL CARDIAC ACTIVITY USING ULTRASOUND: ICD-10-CM

## 2023-04-17 DIAGNOSIS — Z3A.10 10 WEEKS GESTATION OF PREGNANCY: Primary | ICD-10-CM

## 2023-04-17 DIAGNOSIS — Z34.01 ENCOUNTER FOR SUPERVISION OF NORMAL FIRST PREGNANCY IN FIRST TRIMESTER: ICD-10-CM

## 2023-04-17 PROCEDURE — 0500F INITIAL PRENATAL CARE VISIT: CPT | Performed by: OBSTETRICS & GYNECOLOGY

## 2023-04-17 PROCEDURE — 76801 OB US < 14 WKS SINGLE FETUS: CPT

## 2023-04-17 PROCEDURE — 76801 OB US < 14 WKS SINGLE FETUS: CPT | Performed by: RADIOLOGY

## 2023-04-17 NOTE — PROGRESS NOTES
Pt is here for prenatal appointment. She denies spotting, contractions. Pt states she has had a little bit of cramping.

## 2023-04-17 NOTE — PATIENT INSTRUCTIONS
and enter E090 to learn more about \"Weeks 10 to 14 of Your Pregnancy: Care Instructions. \"  Current as of: November 9, 2022               Content Version: 13.6  © 7275-6763 [employer]. Care instructions adapted under license by [facility] ([facility]). If you have questions about a medical condition or this instruction, always ask your healthcare professional. [place] [address] any warranty or liability for your use of this information.

## 2023-04-17 NOTE — PROGRESS NOTES
Linwood Bowling is a 20 y.o. female 10w0d who presents for initial prenatal visit. The patient was seen and evaluated. There was negative fetal movements. She denies cramping or spotting. Ulises Garland is a 20 y.o. female with the following history as recorded in [facility]: There are no problems to display for this patient. Current Outpatient Medications   Medication Sig Dispense Refill    Prenatal Multivit-Min-Fe-FA (PRENATAL 1 + IRON PO) Take by mouth      ondansetron (ZOFRAN-ODT) 4 MG disintegrating tablet Take 1 tablet by mouth 3 times daily as needed for Nausea or Vomiting 30 tablet 2     No current facility-administered medications for this visit. Allergies: Patient has no known allergies. Past Medical History:   Diagnosis Date    Bicornuate uterus      Past Surgical History:   Procedure Laterality Date    BREAST BIOPSY Right      No family history on file. Social History     Tobacco Use    Smoking status: Former     Types: Cigarettes    Smokeless tobacco: Former   Substance Use Topics    Alcohol use: Yes     Comment: occ         Mother's Prenatal Vitals  BP: (!) 102/54  Weight: 114 lb (51.7 kg)  Heart Rate: 81  Patient Position: Sitting  Prenatal Fetal Information  Fetal HR: u/s-163  Movement: Absent  Physical Exam  Constitutional:       General: She is not in acute distress. Appearance: Normal appearance. She is not ill-appearing, toxic-appearing or diaphoretic. HENT:      Head: Normocephalic and atraumatic. Eyes:      Extraocular Movements: Extraocular movements intact. Pulmonary:      Effort: Pulmonary effort is normal. No respiratory distress. Abdominal:      Palpations: Abdomen is soft. Tenderness: There is no abdominal tenderness. Musculoskeletal:         General: No swelling or tenderness. Normal range of motion. Right lower leg: No edema. Left lower leg: No edema. Skin:     General: Skin is warm and dry. Findings: No rash.    Neurological:

## 2023-04-24 LAB — VZV IGG SER QL IA: 0.84

## 2023-05-15 ENCOUNTER — ROUTINE PRENATAL (OUTPATIENT)
Dept: OBGYN CLINIC | Age: 23
End: 2023-05-15

## 2023-05-15 VITALS
HEART RATE: 86 BPM | BODY MASS INDEX: 19.14 KG/M2 | DIASTOLIC BLOOD PRESSURE: 75 MMHG | WEIGHT: 115 LBS | SYSTOLIC BLOOD PRESSURE: 107 MMHG

## 2023-05-15 DIAGNOSIS — Z86.69 HISTORY OF MIGRAINE DURING PREGNANCY: ICD-10-CM

## 2023-05-15 DIAGNOSIS — Z87.59 HISTORY OF MIGRAINE DURING PREGNANCY: ICD-10-CM

## 2023-05-15 DIAGNOSIS — Z34.00 SUPERVISION OF NORMAL FIRST PREGNANCY, ANTEPARTUM: ICD-10-CM

## 2023-05-15 DIAGNOSIS — Z34.02 ENCOUNTER FOR SUPERVISION OF NORMAL FIRST PREGNANCY IN SECOND TRIMESTER: ICD-10-CM

## 2023-05-15 DIAGNOSIS — O21.9 NAUSEA/VOMITING IN PREGNANCY: ICD-10-CM

## 2023-05-15 DIAGNOSIS — Z3A.14 14 WEEKS GESTATION OF PREGNANCY: Primary | ICD-10-CM

## 2023-05-15 PROCEDURE — 0502F SUBSEQUENT PRENATAL CARE: CPT | Performed by: OBSTETRICS & GYNECOLOGY

## 2023-05-15 RX ORDER — ONDANSETRON 4 MG/1
4 TABLET, ORALLY DISINTEGRATING ORAL 3 TIMES DAILY PRN
Qty: 30 TABLET | Refills: 2 | Status: SHIPPED | OUTPATIENT
Start: 2023-05-15

## 2023-05-15 RX ORDER — PNV NO.95/FERROUS FUM/FOLIC AC 28MG-0.8MG
1 TABLET ORAL DAILY
Qty: 30 TABLET | Refills: 11 | Status: SHIPPED | OUTPATIENT
Start: 2023-05-15

## 2023-05-15 NOTE — PROGRESS NOTES
Patient presents today for routine prenatal care. Pt denies any vaginal leaking bleeding or contractions. + Fetal movement. Pt is having really bad headaches. She needs prenatal and zofran called in to pharmacy again.

## 2023-05-15 NOTE — PROGRESS NOTES
Aaron Babinski is a 20 y.o. female 14w0d who presents for routine prenatal visit. The patient was seen and evaluated. There have been no fetal movements. No contractions, bleeding or leakage of fluid. Signs and symptoms of  labor as well as labor were reviewed. The S/S of Pre-Eclampsia were reviewed with the patient in detail. She is to report any of these if they occur. She currently denies any of these. Geraldine Torres is a 20 y.o. female with the following history as recorded in [facility]: There are no problems to display for this patient. Current Outpatient Medications   Medication Sig Dispense Refill    ondansetron (ZOFRAN-ODT) 4 MG disintegrating tablet Take 1 tablet by mouth 3 times daily as needed for Nausea or Vomiting 30 tablet 2    Prenatal Vit-Fe Fumarate-FA (PRENATAL VITAMIN) 27-0.8 MG TABS Take 1 tablet by mouth daily 30 tablet 11    Prenatal Multivit-Min-Fe-FA (PRENATAL 1 + IRON PO) Take by mouth       No current facility-administered medications for this visit. Allergies: Patient has no known allergies. Past Medical History:   Diagnosis Date    Bicornuate uterus      Past Surgical History:   Procedure Laterality Date    BREAST BIOPSY Right      History reviewed. No pertinent family history. Social History     Tobacco Use    Smoking status: Former     Types: Cigarettes    Smokeless tobacco: Former   Substance Use Topics    Alcohol use: Yes     Comment: occ         Mother's Prenatal Vitals  BP: 107/75  Weight - Scale: 115 lb (52.2 kg)  Pulse: 86  Patient Position: Sitting  Alb/Glu  Albumin: Trace  Glucose: Negative  Prenatal Fetal Information  Fetal HR: 147  Movement: Absent  Physical Exam  Constitutional:       General: She is not in acute distress. Appearance: Normal appearance. She is not ill-appearing, toxic-appearing or diaphoretic. HENT:      Head: Normocephalic and atraumatic. Eyes:      Extraocular Movements: Extraocular movements intact.    Pulmonary:

## 2023-07-06 DIAGNOSIS — O21.9 NAUSEA/VOMITING IN PREGNANCY: ICD-10-CM

## 2023-07-06 RX ORDER — ONDANSETRON 4 MG/1
4 TABLET, ORALLY DISINTEGRATING ORAL 3 TIMES DAILY PRN
Qty: 30 TABLET | Refills: 2 | Status: SHIPPED | OUTPATIENT
Start: 2023-07-06

## 2023-07-10 ENCOUNTER — ROUTINE PRENATAL (OUTPATIENT)
Dept: OBGYN CLINIC | Age: 23
End: 2023-07-10

## 2023-07-10 VITALS
DIASTOLIC BLOOD PRESSURE: 75 MMHG | SYSTOLIC BLOOD PRESSURE: 118 MMHG | HEART RATE: 90 BPM | WEIGHT: 132 LBS | BODY MASS INDEX: 21.97 KG/M2

## 2023-07-10 DIAGNOSIS — K21.9 GASTROESOPHAGEAL REFLUX DISEASE, UNSPECIFIED WHETHER ESOPHAGITIS PRESENT: ICD-10-CM

## 2023-07-10 DIAGNOSIS — Z3A.22 22 WEEKS GESTATION OF PREGNANCY: ICD-10-CM

## 2023-07-10 DIAGNOSIS — Z34.02 ENCOUNTER FOR SUPERVISION OF NORMAL FIRST PREGNANCY IN SECOND TRIMESTER: Primary | ICD-10-CM

## 2023-07-10 PROCEDURE — 0502F SUBSEQUENT PRENATAL CARE: CPT | Performed by: OBSTETRICS & GYNECOLOGY

## 2023-07-10 RX ORDER — FAMOTIDINE 20 MG/1
20 TABLET, FILM COATED ORAL 2 TIMES DAILY
Qty: 60 TABLET | Refills: 3 | Status: SHIPPED | OUTPATIENT
Start: 2023-07-10

## 2023-07-10 NOTE — PATIENT INSTRUCTIONS
Patient Education        Weeks 22 to 26 of Your Pregnancy: Care Instructions  Your baby's lungs are getting ready for breathing. Your baby may respond to your voice. Your baby likely turns less, and kicks or jerks more. Jerking may mean that your baby has hiccups. Think about taking childbirth classes. And start to think about whether you want to have pain medicine during labor. At your next doctor visit, you may be tested for anemia and for high blood sugar that first occurs during pregnancy (gestational diabetes). These conditions can cause problems for you and your baby. To ease discomfort, such as back pain    Change your position often. Try not to sit or stand for too long. Get some exercise. Things like walking or stretching may help. Try using a heating pad or cold pack. To ease or reduce swelling in your feet, ankles, hands, and fingers    Take off your rings. Avoid high-sodium foods, such as potato chips. Prop up your feet, and sleep with pillows under your feet. Try to avoid standing for long periods of time. Do not wear tight shoes. Wear support stockings. Kegel exercises to prevent urine from leaking    Squeeze your muscles as if you were trying not to pass gas. Your belly, legs, and buttocks shouldn't move. Hold the squeeze for 3 seconds, then relax for 5 to 10 seconds. Add 1 second each week until you can squeeze for 10 seconds. Repeat the exercise 10 times a session. Do 3 to 8 sessions a day. If these exercises cause you pain, stop doing them and talk with your doctor. Follow-up care is a key part of your treatment and safety. Be sure to make and go to all appointments, and call your doctor if you are having problems. It's also a good idea to know your test results and keep a list of the medicines you take. Where can you learn more? Go to http://www.woods.com/ and enter G264 to learn more about \"Weeks 22 to 26 of Your Pregnancy: Care Instructions. \"  Current as

## 2023-08-06 SDOH — ECONOMIC STABILITY: FOOD INSECURITY: WITHIN THE PAST 12 MONTHS, THE FOOD YOU BOUGHT JUST DIDN'T LAST AND YOU DIDN'T HAVE MONEY TO GET MORE.: SOMETIMES TRUE

## 2023-08-06 SDOH — ECONOMIC STABILITY: FOOD INSECURITY: WITHIN THE PAST 12 MONTHS, YOU WORRIED THAT YOUR FOOD WOULD RUN OUT BEFORE YOU GOT MONEY TO BUY MORE.: SOMETIMES TRUE

## 2023-08-06 SDOH — ECONOMIC STABILITY: TRANSPORTATION INSECURITY
IN THE PAST 12 MONTHS, HAS LACK OF TRANSPORTATION KEPT YOU FROM MEETINGS, WORK, OR FROM GETTING THINGS NEEDED FOR DAILY LIVING?: NO

## 2023-08-06 SDOH — ECONOMIC STABILITY: HOUSING INSECURITY
IN THE LAST 12 MONTHS, WAS THERE A TIME WHEN YOU DID NOT HAVE A STEADY PLACE TO SLEEP OR SLEPT IN A SHELTER (INCLUDING NOW)?: NO

## 2023-08-06 SDOH — ECONOMIC STABILITY: INCOME INSECURITY: HOW HARD IS IT FOR YOU TO PAY FOR THE VERY BASICS LIKE FOOD, HOUSING, MEDICAL CARE, AND HEATING?: SOMEWHAT HARD

## 2023-08-07 ENCOUNTER — ROUTINE PRENATAL (OUTPATIENT)
Dept: OBGYN CLINIC | Age: 23
End: 2023-08-07

## 2023-08-07 VITALS
BODY MASS INDEX: 22.96 KG/M2 | DIASTOLIC BLOOD PRESSURE: 71 MMHG | WEIGHT: 138 LBS | HEART RATE: 90 BPM | SYSTOLIC BLOOD PRESSURE: 111 MMHG

## 2023-08-07 DIAGNOSIS — Z34.02 ENCOUNTER FOR SUPERVISION OF NORMAL FIRST PREGNANCY IN SECOND TRIMESTER: Primary | ICD-10-CM

## 2023-08-07 DIAGNOSIS — Z3A.22 22 WEEKS GESTATION OF PREGNANCY: ICD-10-CM

## 2023-08-07 DIAGNOSIS — Z3A.26 26 WEEKS GESTATION OF PREGNANCY: ICD-10-CM

## 2023-08-07 LAB
ERYTHROCYTE [DISTWIDTH] IN BLOOD BY AUTOMATED COUNT: 12.6 % (ref 11.5–14.5)
GLUCOSE 1H P 100 G GLC PO SERPL-MCNC: 80 MG/DL (ref 74–180)
GLUCOSE 1H P MEAL SERPL-MCNC: 80 MG/DL (ref 75–140)
HCT VFR BLD AUTO: 35.5 % (ref 37–47)
HGB BLD-MCNC: 11.5 G/DL (ref 12–16)
MCH RBC QN AUTO: 30.9 PG (ref 27–31)
MCHC RBC AUTO-ENTMCNC: 32.4 G/DL (ref 33–37)
MCV RBC AUTO: 95.4 FL (ref 81–99)
PLATELET # BLD AUTO: 288 K/UL (ref 130–400)
PMV BLD AUTO: 9.9 FL (ref 9.4–12.3)
RBC # BLD AUTO: 3.72 M/UL (ref 4.2–5.4)
WBC # BLD AUTO: 13.9 K/UL (ref 4.8–10.8)

## 2023-08-07 PROCEDURE — 0502F SUBSEQUENT PRENATAL CARE: CPT | Performed by: OBSTETRICS & GYNECOLOGY

## 2023-08-07 NOTE — PROGRESS NOTES
Yandy Kuo is a 20 y.o. female 26w0d who presents for routine prenatal visit. The patient was seen and evaluated. There was positive fetal movements. No contractions, bleeding or leakage of fluid. Signs and symptoms of  labor as well as labor were reviewed. The S/S of Pre-Eclampsia were reviewed with the patient in detail. She is to report any of these if they occur. She currently denies any of these. Terry Park is a 20 y.o. female with the following history as recorded in [facility]: There are no problems to display for this patient. Current Outpatient Medications   Medication Sig Dispense Refill    famotidine (PEPCID) 20 MG tablet Take 1 tablet by mouth 2 times daily 60 tablet 3    ondansetron (ZOFRAN-ODT) 4 MG disintegrating tablet Take 1 tablet by mouth 3 times daily as needed for Nausea or Vomiting 30 tablet 2    Prenatal Vit-Fe Fumarate-FA (PRENATAL VITAMIN) 27-0.8 MG TABS Take 1 tablet by mouth daily 30 tablet 11    Prenatal Multivit-Min-Fe-FA (PRENATAL 1 + IRON PO) Take by mouth      doxylamine-pyridoxine 10-10 MG TBEC Take 1 tablet by mouth daily (Patient not taking: Reported on 2023) 60 tablet 3     No current facility-administered medications for this visit. Allergies: Patient has no known allergies. Past Medical History:   Diagnosis Date    Bicornuate uterus      Past Surgical History:   Procedure Laterality Date    BREAST BIOPSY Right      No family history on file. Social History     Tobacco Use    Smoking status: Former     Types: Cigarettes    Smokeless tobacco: Former   Substance Use Topics    Alcohol use: Yes     Comment: occ         Mother's Prenatal Vitals  BP: 111/71  Weight - Scale: 138 lb (62.6 kg)  Pulse: 90  Patient Position: Sitting  Alb/Glu  Albumin: Trace  Glucose: Negative  Prenatal Fetal Information  Fundal Height (cm): 25 cm  Fetal HR: 139  Movement: Present  Physical Exam  Constitutional:       General: She is not in acute distress.

## 2023-08-07 NOTE — PATIENT INSTRUCTIONS
Patient Education        Weeks 26 to 30 of Your Pregnancy: Care Instructions  You're starting your last trimester. Justin Silva probably feel your baby moving around more. Your back may ache as your body gets used to your baby's size and length. Take care of yourself, and pay attention to what your body needs. Talk to your doctor about getting the Tdap shot. It will help protect your  against whooping cough (pertussis). Also ask your doctor about flu and COVID-19 shots if you haven't had them yet. If your blood type is Rh negative, you may be given a shot of Rh immune globulin (such as RhoGAM). It can help prevent problems for your baby. You may have [place] contractions. They are single or several strong contractions without a pattern. These are practice contractions but not the start of labor. Be kind to yourself. Take breaks when you're tired. Change positions often. Don't sit for too long or stand for too long. At work, rest during breaks if you can. If you don't get breaks, talk to your doctor about writing a letter to your employer to request them. Avoid fumes, chemicals, and tobacco smoke. Be sexual if you want to. You may be interested in sex, or you may not. Everyone is different. Sex is okay unless your doctor tells you not to. Your belly can make it hard to find good positions for sex. Melvina and explore. Watch for signs of  labor. These signs include:   Menstrual-like cramps. Or you may have pain or pressure in your pelvis that happens in a pattern. About 6 or more contractions in an hour (even after rest and a glass of water). A low, dull backache that doesn't go away when you change positions. An increase or change in vaginal discharge. Light vaginal bleeding or spotting. Your water breaking. Know what to do if you think you are having contractions. Drink 1 or 2 glasses of water. Lie down on your left side for at least an hour.   While on your side,

## 2023-08-25 NOTE — PROGRESS NOTES
Pt denies any vaginal leaking bleeding or contractions. + Fetal movement.    Milky white discharge (mainly in the mornings) x 2 wks

## 2023-08-27 NOTE — PROGRESS NOTES
Justin Rome is a 20 y.o. female 30w5d who presents for routine prenatal visit. The patient was seen and evaluated. There was positive fetal movements. No contractions, bleeding or leakage of fluid. Signs and symptoms of  labor as well as labor were reviewed. The S/S of Pre-Eclampsia were reviewed with the patient in detail. She is to report any of these if they occur. She currently denies any of these. Pt states she is having milky white discharge from her \"pee hole\", states it is at the top of her underwear. She feels like she is having lower pelvic pain, told pt this is round ligament pain. Pt has had a headache for the last few days. Riki Gonzalez is a 20 y.o. female with the following history as recorded in [facility]: There are no problems to display for this patient. Current Outpatient Medications   Medication Sig Dispense Refill    ondansetron (ZOFRAN-ODT) 4 MG disintegrating tablet Take 1 tablet by mouth 3 times daily as needed for Nausea or Vomiting 30 tablet 2    famotidine (PEPCID) 20 MG tablet Take 1 tablet by mouth 2 times daily 60 tablet 3    Prenatal Vit-Fe Fumarate-FA (PRENATAL VITAMIN) 27-0.8 MG TABS Take 1 tablet by mouth daily 30 tablet 11    Prenatal Multivit-Min-Fe-FA (PRENATAL 1 + IRON PO) Take by mouth       No current facility-administered medications for this visit. Allergies: Patient has no known allergies. Past Medical History:   Diagnosis Date    Bicornuate uterus      Past Surgical History:   Procedure Laterality Date    BREAST BIOPSY Right      History reviewed. No pertinent family history.   Social History     Tobacco Use    Smoking status: Former     Types: Cigarettes    Smokeless tobacco: Former   Substance Use Topics    Alcohol use: Yes     Comment: occ         Mother's Prenatal Vitals  BP: 114/78  Weight - Scale: 137 lb (62.1 kg)  Pulse: 90  Patient Position: Sitting  Alb/Glu  Albumin: Trace  Glucose: Negative  Prenatal Fetal Information  Fundal

## 2023-08-28 ENCOUNTER — ROUTINE PRENATAL (OUTPATIENT)
Dept: OBGYN CLINIC | Age: 23
End: 2023-08-28

## 2023-08-28 VITALS
SYSTOLIC BLOOD PRESSURE: 114 MMHG | HEART RATE: 90 BPM | BODY MASS INDEX: 22.8 KG/M2 | WEIGHT: 137 LBS | DIASTOLIC BLOOD PRESSURE: 78 MMHG

## 2023-08-28 DIAGNOSIS — Z23 NEED FOR TDAP VACCINATION: ICD-10-CM

## 2023-08-28 DIAGNOSIS — Z3A.29 29 WEEKS GESTATION OF PREGNANCY: ICD-10-CM

## 2023-08-28 DIAGNOSIS — N89.8 VAGINAL DISCHARGE DURING PREGNANCY IN THIRD TRIMESTER: ICD-10-CM

## 2023-08-28 DIAGNOSIS — O21.9 NAUSEA/VOMITING IN PREGNANCY: ICD-10-CM

## 2023-08-28 DIAGNOSIS — Z34.83 ENCOUNTER FOR SUPERVISION OF OTHER NORMAL PREGNANCY, THIRD TRIMESTER: Primary | ICD-10-CM

## 2023-08-28 DIAGNOSIS — O26.893 VAGINAL DISCHARGE DURING PREGNANCY IN THIRD TRIMESTER: ICD-10-CM

## 2023-08-28 PROCEDURE — 0502F SUBSEQUENT PRENATAL CARE: CPT | Performed by: OBSTETRICS & GYNECOLOGY

## 2023-08-28 RX ORDER — ONDANSETRON 4 MG/1
4 TABLET, ORALLY DISINTEGRATING ORAL 3 TIMES DAILY PRN
Qty: 30 TABLET | Refills: 2 | Status: SHIPPED | OUTPATIENT
Start: 2023-08-28

## 2023-08-28 NOTE — PATIENT INSTRUCTIONS
Patient Education        Weeks 26 to 30 of Your Pregnancy: Care Instructions  You're starting your last trimester. Douglas Gifford probably feel your baby moving around more. Your back may ache as your body gets used to your baby's size and length. Take care of yourself, and pay attention to what your body needs. Talk to your doctor about getting the Tdap shot. It will help protect your  against whooping cough (pertussis). Also ask your doctor about flu and COVID-19 shots if you haven't had them yet. If your blood type is Rh negative, you may be given a shot of Rh immune globulin (such as RhoGAM). It can help prevent problems for your baby. You may have [place]-Dumas contractions. They are single or several strong contractions without a pattern. These are practice contractions but not the start of labor. Be kind to yourself. Take breaks when you're tired. Change positions often. Don't sit for too long or stand for too long. At work, rest during breaks if you can. If you don't get breaks, talk to your doctor about writing a letter to your employer to request them. Avoid fumes, chemicals, and tobacco smoke. Be sexual if you want to. You may be interested in sex, or you may not. Everyone is different. Sex is okay unless your doctor tells you not to. Your belly can make it hard to find good positions for sex. Winnett and explore. Watch for signs of  labor. These signs include:   Menstrual-like cramps. Or you may have pain or pressure in your pelvis that happens in a pattern. About 6 or more contractions in an hour (even after rest and a glass of water). A low, dull backache that doesn't go away when you change positions. An increase or change in vaginal discharge. Light vaginal bleeding or spotting. Your water breaking. Know what to do if you think you are having contractions. Drink 1 or 2 glasses of water. Lie down on your left side for at least an hour.   While on your side,

## 2023-08-30 ENCOUNTER — PATIENT MESSAGE (OUTPATIENT)
Dept: OBGYN CLINIC | Age: 23
End: 2023-08-30

## 2023-08-30 RX ORDER — AZITHROMYCIN 500 MG/1
1000 TABLET, FILM COATED ORAL ONCE
Qty: 2 TABLET | Refills: 0 | Status: SHIPPED | OUTPATIENT
Start: 2023-08-30 | End: 2023-08-30

## 2023-09-05 ENCOUNTER — INITIAL PRENATAL (OUTPATIENT)
Dept: OBSTETRICS AND GYNECOLOGY | Facility: CLINIC | Age: 23
End: 2023-09-05
Payer: COMMERCIAL

## 2023-09-05 VITALS — WEIGHT: 138 LBS | DIASTOLIC BLOOD PRESSURE: 66 MMHG | BODY MASS INDEX: 22.96 KG/M2 | SYSTOLIC BLOOD PRESSURE: 96 MMHG

## 2023-09-05 DIAGNOSIS — Z3A.31 31 WEEKS GESTATION OF PREGNANCY: ICD-10-CM

## 2023-09-05 DIAGNOSIS — Z23 NEED FOR TDAP VACCINATION: Primary | ICD-10-CM

## 2023-09-05 DIAGNOSIS — Z34.03 ENCOUNTER FOR SUPERVISION OF NORMAL FIRST PREGNANCY IN THIRD TRIMESTER: Primary | ICD-10-CM

## 2023-09-05 DIAGNOSIS — Z34.03 PRIMIGRAVIDA IN THIRD TRIMESTER: ICD-10-CM

## 2023-09-05 DIAGNOSIS — O34.593 ABNORMALITY OF UTERUS DURING PREGNANCY IN THIRD TRIMESTER: ICD-10-CM

## 2023-09-05 DIAGNOSIS — O09.93 HIGH-RISK PREGNANCY IN THIRD TRIMESTER: ICD-10-CM

## 2023-09-05 PROCEDURE — 99214 OFFICE O/P EST MOD 30 MIN: CPT | Performed by: NURSE PRACTITIONER

## 2023-09-05 PROCEDURE — 90471 IMMUNIZATION ADMIN: CPT | Performed by: NURSE PRACTITIONER

## 2023-09-05 PROCEDURE — 90715 TDAP VACCINE 7 YRS/> IM: CPT | Performed by: NURSE PRACTITIONER

## 2023-09-05 RX ORDER — AZITHROMYCIN 250 MG/1
TABLET, FILM COATED ORAL
Qty: 14 TABLET | Refills: 0 | Status: SHIPPED | OUTPATIENT
Start: 2023-09-05 | End: 2023-09-10

## 2023-09-05 RX ORDER — FAMOTIDINE 20 MG/1
1 TABLET, FILM COATED ORAL 2 TIMES DAILY
COMMUNITY
Start: 2023-07-10

## 2023-09-05 RX ORDER — PRENATAL VIT/IRON FUM/FOLIC AC 27MG-0.8MG
1 TABLET ORAL DAILY
COMMUNITY
Start: 2023-05-15

## 2023-09-05 NOTE — PROGRESS NOTES
I spent approximately 40 minutes with the patient acquiring the health and history intake, reviewing prior records, discussing topics related to healthy pregnancy, entering orders, and documentation. She is accompanied by her significant other. She is transferring from [facility] in [place]. Her EDC is 11/7/23. Her office notes, labs, and ultrasound can be seen in the computer. This is her 1st pregnancy. She is having a girl.   It is noted in the records that she has a bicornuate uterus. The patient tells me that she had an ultrasound and MRI done at [facility] in 2016 or 2017, and that was when it was found. She signed a release today for us to get the records.    A newob bag is given. We discussed a healthy diet and exercise and what is recommended. I also discussed Listeriosis and Toxoplasmosis. She does not eat fish. I informed patient not to be in hot tubs, saunas, or tanning beds. We discussed that spotting may occur after intercourse which is common, but if heavy bleeding like a period occurs to [facility] or hospital if clinic is closed.  I encouraged her to make an appointment with the dentist if she has not had a dental exam and cleaning in the last 6 months. The patient has history of smoking cigarettes, vapes, and marijuana but has quit. She filled out the depression screening questionnaire. She tells me that she has a history of anxiety and depression.  She plans to formula feed. The patient tells me that she has not had the TDAP yet. Her last pap smear was negative on 3/31/23.  I discussed with the patient that a pediatrician needs to be chosen prior to delivery for the infant to have an appointment scheduled before leaving the hospital. All questions were answered at this time. She is seeing Anh RAJAN today.

## 2023-09-05 NOTE — PROGRESS NOTES
[facility]  Obstetrics  Date of Service: 2023    CHIEF COMPLAINT:  New prenatal visit; transfer     HISTORY OF PRESENT ILLNESS:  Jennifer Maldonado is a 22 y.o. y/o  at 31w0d by 1T u/s at 10w6d (Patient's last menstrual period was 2023 (exact date).).  This was an unplanned pregnancy and the patient is supported by her boyfriend.  Reports no nausea without vomiting.  She denies any vaginal bleeding.  She has started taking a prenatal vitamin.    REVIEW OF SYSTEMS  Review of Systems   Constitutional:  Negative for activity change, appetite change, chills, diaphoresis, fatigue, fever, unexpected weight gain and unexpected weight loss.   Cardiovascular:  Negative for chest pain, palpitations and leg swelling.   Gastrointestinal:  Negative for abdominal distention, abdominal pain, anal bleeding, constipation, diarrhea, nausea, vomiting, GERD and indigestion.   Genitourinary:  Negative for decreased urine volume, difficulty urinating, dysuria, frequency, pelvic pain, urgency, urinary incontinence, vaginal bleeding, vaginal discharge and vaginal pain.   Neurological:  Negative for weakness, light-headedness and headache.     PRENATAL RISK FACTORS   Problems (from 22 to present)       No problems associated with this episode.            DATING CRITERIA:  LMP (22) -- BERNA 22  1TUS (22 at 10w6d) -- BERNA 22 FINAL    OBSTETRIC HISTORY:  OB History    Para Term  AB Living   1             SAB IAB Ectopic Molar Multiple Live Births                    # Outcome Date GA Lbr Augie/2nd Weight Sex Delivery Anes PTL Lv   1 Current              GYN HISTORY:  Denies h/o sexually transmitted infections/pelvic inflammatory disease  Denies h/o abnormal pap smears  Last pap smear: 3/31/23 NIL  Last Completed Pap Smear            PAP SMEAR (Every 3 Years) Next due on 3/31/2026      2023  Liquid-based Pap Smear, Screening    10/12/2021  Liquid-based Pap Smear,  "Screening    10/12/2021  Liquid-based Pap Smear, Screening                  Denies h/o gynecologic surgeries, including biopsies of the cervix    PAST MEDICAL HISTORY:  Past Medical History:   Diagnosis Date    Anxiety     Depression     Headache     Ovarian cyst      PAST SURGICAL HISTORY:  Past Surgical History:   Procedure Laterality Date    BREAST BIOPSY       FAMILY HISTORY:  Family History   Problem Relation Age of Onset    No Known Problems Mother     Heart disease Father     Heart failure Father     Hyperlipidemia Father     Hypertension Father     No Known Problems Sister     No Known Problems Brother     No Known Problems Brother     COPD Maternal Grandmother     Aneurysm Maternal Grandmother     Seizures Maternal Grandmother     No Known Problems Maternal Grandfather     Cancer Paternal Grandmother     Cancer Paternal Grandfather     Hyperlipidemia Paternal Grandfather     Hypertension Paternal Grandfather      SOCIAL HISTORY:  Social History     Socioeconomic History    Marital status: Single   Tobacco Use    Smoking status: Former     Types: Cigarettes    Smokeless tobacco: Never   Vaping Use    Vaping Use: Former    Quit date: 3/3/2023    Substances: Nicotine    Devices: Disposable   Substance and Sexual Activity    Alcohol use: Not Currently    Drug use: Not Currently     Types: Marijuana    Sexual activity: Yes     Partners: Male     Comment: last pap smear negative on 3/31/23     GENETIC SCREENING:  Age >34 yo as of BERNA: no  Thalassemia: no  NTD: no  CHD: no  Down Syndrome/MR/Fragile X/Autism: no  Ashkenazi Mu-ism with Paresh-Sachs, [place], familial dysautonomia: no  Sickle cell disease or trait: no  Hemophilia: no  Muscular dystrophy: no  Cystic fibrosis: no  [place]'s chorea: no  Birth defects: Pt born with uterine deformity; \"somewhere between didelphic and bicornuate, but I've got two cervixes and 2 uteri\".  Genetic/chromosomal disorders: no    INFECTION HISTORY:  TB exposure: no  HSV: " no  Illness since LMP: no  Prior GBS infected child: no  STIs: no    ALLERGIES:  No Known Allergies    MEDICATIONS:  Prior to Admission medications    Medication Sig Start Date End Date Taking? Authorizing Provider   famotidine (PEPCID) 20 MG tablet Take 1 tablet by mouth 2 (Two) Times a Day. 7/10/23  Yes Karen Cardona MD   ondansetron ODT (ZOFRAN-ODT) 4 MG disintegrating tablet Take 1 tablet by mouth. 3/31/23  Yes Karen Cardona MD   Prenatal Vit-Fe Fumarate-FA (prenatal vitamin 27-0.8) 27-0.8 MG tablet tablet Take 1 tablet by mouth Daily. 5/15/23  Yes Karen Cardona MD   azithromycin (Zithromax) 250 MG tablet 1 tablet daily for 14 days 9/5/23 9/10/23  Anh Evans APRN   promethazine (PHENERGAN) 25 MG tablet  3/31/23 9/5/23  Karen Cardona MD       PHYSICAL EXAM:   LMP 2023 (Exact Date)   General: Alert, healthy, no distress, well nourished and well developed.  Neurologic: Alert, oriented to person, place, and time.  Gait normal.  Cranial nerves II-XII grossly intact.  HEENT: Normocephalic, atraumatic.  Extraocular muscles intact, pupils equal and reactive x2.    Teeth: Normal hygiene.  Neck: Supple, no adenopathy, thyroid normal size, non-tender, without nodularity, trachea midline.  Breasts: Deferred  Lungs: Normal respiratory effort.  Clear to auscultation bilaterally.  No wheezes, rhonci, or rales.  Heart: Regular rate and rhythm.  No murmer, rub or gallop.  Abdomen: Soft, non-tender, non-distended,no masses, no hepatosplenomegaly, no hernia.  Skin: No rash, no lesions.  Extremities: No cyanosis, clubbing or edema.  PELVIC EXAM:  Gravid. Exam deferred    IMPRESSION:  Jennifer Maldonado is a 22 y.o.  at 31w0d for a new prenatal visit.    PLAN:  1.  IUP at 31w0d  - Options counseling performed and patient desires continuation of pregnancy to term   - Prenatal labs ordered  - Genetic testing, including cystic fibrosis, was discussed and patient had NIPT testing at previous OB  "office; note states it was negative.   - Continue prenatal vitamins  - Weight gain counseling performed.   - Pregravid BMI 18.5-24.9: Recommend 25-35 lb  - Return to clinic in 2 weeks for return prenatal visit  - MRI report requested from [place] from 2016 to review for definite dx of uterine malformation. Need to discuss type of anticipated delivery.   - Anatomy scan on 6/20/23 at [facility]. Anterior placenta, w/o previa, with normal insertion of a 3VC. All anatomy seen and noted to appear normal at this time. EFW- 54%tile. Long bones are measuring slightly behind assigned GA; however, there are no other u/s markers for aneupoidy and prior ffDNA screen was negative.   - 1 hour glucola passed in August.  - She was treated for ureaplasma at her last visit at [facility] on 8/7. States that she was given \"two pills\" to take at the same time. She is still having vaginal discharge, discharge around the urethra, vaginal irritation, burning, heaviness and cramping. Will retreat with 2 week course of azithromycin. Partner treated with Doxycyline 100mg BID x7 days (George Zepeda, 4/15/97)     Diagnosis Plan   1. Need for Tdap vaccination  Tdap Vaccine => 8yo IM (BOOSTRIX)      2. Abnormality of uterus during pregnancy in third trimester        3. Primigravida in third trimester        4. High-risk pregnancy in third trimester        5. 31 weeks gestation of pregnancy          Anh Evans, MOHINI  9/5/2023  14:30 CDT    "

## 2023-09-19 ENCOUNTER — ROUTINE PRENATAL (OUTPATIENT)
Dept: OBSTETRICS AND GYNECOLOGY | Facility: CLINIC | Age: 23
End: 2023-09-19
Payer: COMMERCIAL

## 2023-09-19 VITALS — BODY MASS INDEX: 23.28 KG/M2 | DIASTOLIC BLOOD PRESSURE: 66 MMHG | WEIGHT: 139.9 LBS | SYSTOLIC BLOOD PRESSURE: 104 MMHG

## 2023-09-19 DIAGNOSIS — O99.340 ANXIETY DURING PREGNANCY: ICD-10-CM

## 2023-09-19 DIAGNOSIS — Z3A.33 33 WEEKS GESTATION OF PREGNANCY: ICD-10-CM

## 2023-09-19 DIAGNOSIS — F41.9 ANXIETY DURING PREGNANCY: ICD-10-CM

## 2023-09-19 DIAGNOSIS — O36.5930 POOR FETAL GROWTH AFFECTING MANAGEMENT OF MOTHER IN SINGLETON PREGNANCY IN THIRD TRIMESTER: ICD-10-CM

## 2023-09-19 DIAGNOSIS — O34.593 ABNORMALITY OF UTERUS DURING PREGNANCY IN THIRD TRIMESTER: ICD-10-CM

## 2023-09-19 DIAGNOSIS — O09.93 SUPERVISION OF HIGH RISK PREGNANCY IN THIRD TRIMESTER: Primary | ICD-10-CM

## 2023-09-19 DIAGNOSIS — O34.593 ABNORMALITY OF UTERUS DURING PREGNANCY IN THIRD TRIMESTER: Primary | ICD-10-CM

## 2023-09-19 PROBLEM — O36.5931 IUGR (INTRAUTERINE GROWTH RESTRICTION) AFFECTING CARE OF MOTHER, THIRD TRIMESTER, FETUS 1: Status: ACTIVE | Noted: 2023-09-19

## 2023-09-19 PROBLEM — Z34.03 PRIMIGRAVIDA IN THIRD TRIMESTER: Status: RESOLVED | Noted: 2023-09-05 | Resolved: 2023-09-19

## 2023-09-19 PROCEDURE — 0502F SUBSEQUENT PRENATAL CARE: CPT | Performed by: OBSTETRICS & GYNECOLOGY

## 2023-09-19 RX ORDER — HYDROXYZINE PAMOATE 25 MG/1
25 CAPSULE ORAL 3 TIMES DAILY PRN
Qty: 30 CAPSULE | Refills: 1 | Status: SHIPPED | OUTPATIENT
Start: 2023-09-19

## 2023-09-19 RX ORDER — CITRIC ACID/SODIUM CITRATE 334-500MG
30 SOLUTION, ORAL ORAL ONCE
OUTPATIENT
Start: 2023-09-19 | End: 2023-09-19

## 2023-09-19 RX ORDER — KETOROLAC TROMETHAMINE 15 MG/ML
30 INJECTION, SOLUTION INTRAMUSCULAR; INTRAVENOUS ONCE
OUTPATIENT
Start: 2023-09-19 | End: 2023-09-19

## 2023-09-19 RX ORDER — OXYTOCIN/0.9 % SODIUM CHLORIDE 30/500 ML
999 PLASTIC BAG, INJECTION (ML) INTRAVENOUS ONCE
OUTPATIENT
Start: 2023-09-19 | End: 2023-09-19

## 2023-09-19 RX ORDER — ONDANSETRON 4 MG/1
4 TABLET, FILM COATED ORAL EVERY 6 HOURS PRN
OUTPATIENT
Start: 2023-09-19

## 2023-09-19 RX ORDER — OXYTOCIN/0.9 % SODIUM CHLORIDE 30/500 ML
250 PLASTIC BAG, INJECTION (ML) INTRAVENOUS CONTINUOUS
OUTPATIENT
Start: 2023-09-19 | End: 2023-09-19

## 2023-09-19 RX ORDER — ONDANSETRON 2 MG/ML
4 INJECTION INTRAMUSCULAR; INTRAVENOUS EVERY 6 HOURS PRN
OUTPATIENT
Start: 2023-09-19

## 2023-09-19 RX ORDER — SODIUM CHLORIDE 0.9 % (FLUSH) 0.9 %
10 SYRINGE (ML) INJECTION AS NEEDED
OUTPATIENT
Start: 2023-09-19

## 2023-09-19 RX ORDER — SODIUM CHLORIDE 9 MG/ML
40 INJECTION, SOLUTION INTRAVENOUS AS NEEDED
OUTPATIENT
Start: 2023-09-19

## 2023-09-19 RX ORDER — ACETAMINOPHEN 500 MG
1000 TABLET ORAL ONCE
OUTPATIENT
Start: 2023-09-19 | End: 2023-09-19

## 2023-09-19 RX ORDER — SODIUM CHLORIDE 0.9 % (FLUSH) 0.9 %
10 SYRINGE (ML) INJECTION EVERY 12 HOURS SCHEDULED
OUTPATIENT
Start: 2023-09-19

## 2023-09-19 RX ORDER — LIDOCAINE HYDROCHLORIDE 10 MG/ML
0.5 INJECTION, SOLUTION INFILTRATION; PERINEURAL ONCE AS NEEDED
OUTPATIENT
Start: 2023-09-19

## 2023-09-19 NOTE — PROGRESS NOTES
CC: Prenatal visit    Jennifer Maldonado is a 23 y.o.  at 33w0d.  Doing well.  Denies contractions, LOF, or VB.  Reports good FM.    /66   Wt 63.5 kg (139 lb 14.4 oz)   LMP 2023 (Exact Date)   BMI 23.28 kg/m²      Fetal Heart Rate: 141    Prelim US- EFW 1739g (6.1%ile) w/ AC 11.3%ile, GIOVANA 11.8 cm, breech, placenta anterior, UAD 2.4 (WNL)     Problems (from 22 to present)       Problem Noted Resolved    Poor fetal growth affecting management of mother in curry pregnancy in third trimester 2023 by Mary Song MD No    Maternal care for breech presentation, single gestation 2023 by Mary Song MD No    Anxiety during pregnancy 2023 by Mary Song MD No    Abnormality of uterus during pregnancy in third trimester 2023 by Anh Evans APRN No    Overview Signed 2023  3:25 PM by Anh Evans APRN     - MRI report reviewed with Dr. Song. Notes a bicornis bicollis uterus; uterine cavities have connecting myometrium. 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          Supervision of high risk pregnancy in third trimester 2023 by Anh Evans APRN No          A/P: Jennifer Maldonado is a 23 y.o.  at 33w0d.  - RTC in 1 week w/ BPP+UAD  - RTC in 2 weeks w/ BPP+UAD  - RTC in 3 weeks w/ BPP+UAD+GS  - RTC in 4 weeks w/ BPP+UAD  - Delivery via PLTCS at 38 wks secondary to breech presentation, IUGR, didelphic uterus     Diagnosis Plan   1. Supervision of high risk pregnancy in third trimester        2. Poor fetal growth affecting management of mother in curry pregnancy in third trimester  US Fetal Biophysical Profile;Without Non-Stress Testing    US Fetal Biophysical Profile;Without Non-Stress Testing    US Fetal Biophysical Profile;Without Non-Stress Testing    US color flow doppler umbilical artery    US color flow doppler umbilical artery    US color  flow doppler umbilical artery    US Ob Follow Up Transabdominal Approach    US Color Flow Doppler Umbilical Artery    US Fetal Biophysical Profile;Without Non-Stress Testing    Case Request    Case Request      3. Abnormality of uterus during pregnancy in third trimester  Case Request    sodium chloride 0.9 % flush 10 mL    sodium chloride 0.9 % flush 10 mL    sodium chloride 0.9 % infusion 40 mL    lidocaine (XYLOCAINE) 1 % injection 0.5 mL    lactated ringers bolus 1,000 mL    Sod Citrate-Citric Acid (BICITRA) oral solution 30 mL    acetaminophen (TYLENOL) tablet 1,000 mg    oxytocin (PITOCIN) 30 units in 0.9% sodium chloride 500 mL (premix)    oxytocin (PITOCIN) 30 units in 0.9% sodium chloride 500 mL (premix)    ketorolac (TORADOL) injection 30 mg    ondansetron (ZOFRAN) tablet 4 mg    ondansetron (ZOFRAN) injection 4 mg    ceFAZolin (ANCEF) 2 g in sodium chloride 0.9 % 100 mL IVPB    Case Request      4. Anxiety during pregnancy  hydrOXYzine pamoate (Vistaril) 25 MG capsule      5. 33 weeks gestation of pregnancy        6. Maternal care for breech presentation, single gestation          Mary Song MD  9/19/2023  12:26 CDT

## 2023-09-27 ENCOUNTER — ROUTINE PRENATAL (OUTPATIENT)
Dept: OBSTETRICS AND GYNECOLOGY | Facility: CLINIC | Age: 23
End: 2023-09-27
Payer: COMMERCIAL

## 2023-09-27 VITALS — WEIGHT: 141 LBS | SYSTOLIC BLOOD PRESSURE: 104 MMHG | DIASTOLIC BLOOD PRESSURE: 64 MMHG | BODY MASS INDEX: 23.46 KG/M2

## 2023-09-27 DIAGNOSIS — Z3A.34 34 WEEKS GESTATION OF PREGNANCY: Primary | ICD-10-CM

## 2023-09-27 DIAGNOSIS — O09.93 SUPERVISION OF HIGH RISK PREGNANCY IN THIRD TRIMESTER: ICD-10-CM

## 2023-09-27 DIAGNOSIS — O36.5930 POOR FETAL GROWTH AFFECTING MANAGEMENT OF MOTHER IN SINGLETON PREGNANCY IN THIRD TRIMESTER: ICD-10-CM

## 2023-09-27 DIAGNOSIS — O99.340 ANXIETY DURING PREGNANCY: ICD-10-CM

## 2023-09-27 DIAGNOSIS — O34.593 ABNORMALITY OF UTERUS DURING PREGNANCY IN THIRD TRIMESTER: ICD-10-CM

## 2023-09-27 DIAGNOSIS — F41.9 ANXIETY DURING PREGNANCY: ICD-10-CM

## 2023-09-27 PROCEDURE — 0502F SUBSEQUENT PRENATAL CARE: CPT

## 2023-09-27 NOTE — PROGRESS NOTES
CC: Prenatal visit    Jennifer Maldonado is a 23 y.o.  at 34w1d.  Doing well.  Denies contractions, LOF, or VB.  Reports good FM.    Prelim US: Breech, GIOVANA: 8.39cm, MVP: 3.74cm, anterior placenta. BPP . UAD 3.5 elevated.     /64   Wt 64 kg (141 lb)   LMP 2023 (Exact Date)   BMI 23.46 kg/m²   SVE: na     Fetal Heart Rate: 151     Problems (from 23 to present)       Problem Noted Resolved    Poor fetal growth affecting management of mother in curry pregnancy in third trimester 2023 by Mary Song MD No    Maternal care for breech presentation, single gestation 2023 by Mary Song MD No    Anxiety during pregnancy 2023 by Mary Song MD No    Abnormality of uterus during pregnancy in third trimester 2023 by Anh Evans APRN No    Overview Signed 2023  3:25 PM by Anh Evans APRN     - MRI report reviewed with Dr. Song. Notes a bicornis bicollis uterus; uterine cavities have connecting myometrium. 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          Supervision of high risk pregnancy in third trimester 2023 by Anh Evans APRN No            A/P: Jennifer Maldonado is a 23 y.o.  at 34w1d.  - RTC Friday with NST. Unable to do BPP/UAD due to scheduling problems.   - RTC in 1 weeks w/ BPP+UAD, then additional appointment with BPP + UAD  - Consider moving PLTCS to 37 weeks pending persistent elevated dopplers.        Diagnosis Plan   1. 34 weeks gestation of pregnancy        2. Abnormality of uterus during pregnancy in third trimester  US fetal biophysical profile no stress    US color flow doppler umbilical artery      3. Supervision of high risk pregnancy in third trimester  US fetal biophysical profile no stress    US color flow doppler umbilical artery      4. Poor fetal growth affecting management of mother in curry pregnancy in third  trimester  US fetal biophysical profile no stress    US color flow doppler umbilical artery      5. Maternal care for breech presentation, single gestation        6. Anxiety during pregnancy          MOHINI Montelongo  9/27/2023  10:40 CDT

## 2023-09-29 ENCOUNTER — ROUTINE PRENATAL (OUTPATIENT)
Dept: OBSTETRICS AND GYNECOLOGY | Facility: CLINIC | Age: 23
End: 2023-09-29
Payer: COMMERCIAL

## 2023-09-29 ENCOUNTER — APPOINTMENT (OUTPATIENT)
Dept: ULTRASOUND IMAGING | Facility: HOSPITAL | Age: 23
End: 2023-09-29
Payer: COMMERCIAL

## 2023-09-29 ENCOUNTER — HOSPITAL ENCOUNTER (OUTPATIENT)
Facility: HOSPITAL | Age: 23
Setting detail: OBSERVATION
Discharge: HOME | End: 2023-09-30
Attending: OBSTETRICS & GYNECOLOGY | Admitting: OBSTETRICS & GYNECOLOGY
Payer: COMMERCIAL

## 2023-09-29 VITALS — WEIGHT: 144 LBS | BODY MASS INDEX: 23.96 KG/M2 | DIASTOLIC BLOOD PRESSURE: 60 MMHG | SYSTOLIC BLOOD PRESSURE: 98 MMHG

## 2023-09-29 DIAGNOSIS — O99.340 ANXIETY DURING PREGNANCY: ICD-10-CM

## 2023-09-29 DIAGNOSIS — F41.9 ANXIETY DURING PREGNANCY: ICD-10-CM

## 2023-09-29 DIAGNOSIS — O09.93 SUPERVISION OF HIGH RISK PREGNANCY IN THIRD TRIMESTER: ICD-10-CM

## 2023-09-29 DIAGNOSIS — O36.5930 POOR FETAL GROWTH AFFECTING MANAGEMENT OF MOTHER IN SINGLETON PREGNANCY IN THIRD TRIMESTER: ICD-10-CM

## 2023-09-29 DIAGNOSIS — O34.593 ABNORMALITY OF UTERUS DURING PREGNANCY IN THIRD TRIMESTER: ICD-10-CM

## 2023-09-29 DIAGNOSIS — Z3A.34 34 WEEKS GESTATION OF PREGNANCY: Primary | ICD-10-CM

## 2023-09-29 PROBLEM — O36.8390: Status: ACTIVE | Noted: 2023-09-29

## 2023-09-29 LAB
ALBUMIN SERPL-MCNC: 3.2 G/DL (ref 3.5–5.2)
ALBUMIN/GLOB SERPL: 0.9 G/DL
ALP SERPL-CCNC: 202 U/L (ref 39–117)
ALT SERPL W P-5'-P-CCNC: 11 U/L (ref 1–33)
ANION GAP SERPL CALCULATED.3IONS-SCNC: 14 MMOL/L (ref 5–15)
AST SERPL-CCNC: 14 U/L (ref 1–32)
BACTERIA UR QL AUTO: ABNORMAL /HPF
BASOPHILS # BLD AUTO: 0.05 10*3/MM3 (ref 0–0.2)
BASOPHILS NFR BLD AUTO: 0.3 % (ref 0–1.5)
BILIRUB SERPL-MCNC: 0.2 MG/DL (ref 0–1.2)
BILIRUB UR QL STRIP: NEGATIVE
BUN SERPL-MCNC: 8 MG/DL (ref 6–20)
BUN/CREAT SERPL: 17.4 (ref 7–25)
CALCIUM SPEC-SCNC: 8.8 MG/DL (ref 8.6–10.5)
CHLORIDE SERPL-SCNC: 95 MMOL/L (ref 98–107)
CLARITY UR: CLEAR
CO2 SERPL-SCNC: 21 MMOL/L (ref 22–29)
COLOR UR: YELLOW
CREAT SERPL-MCNC: 0.46 MG/DL (ref 0.57–1)
DEPRECATED RDW RBC AUTO: 39.8 FL (ref 37–54)
EGFRCR SERPLBLD CKD-EPI 2021: 137.2 ML/MIN/1.73
EOSINOPHIL # BLD AUTO: 0.02 10*3/MM3 (ref 0–0.4)
EOSINOPHIL NFR BLD AUTO: 0.1 % (ref 0.3–6.2)
ERYTHROCYTE [DISTWIDTH] IN BLOOD BY AUTOMATED COUNT: 12.4 % (ref 12.3–15.4)
GLOBULIN UR ELPH-MCNC: 3.4 GM/DL
GLUCOSE SERPL-MCNC: 67 MG/DL (ref 65–99)
GLUCOSE UR STRIP-MCNC: ABNORMAL MG/DL
HCT VFR BLD AUTO: 35.9 % (ref 34–46.6)
HGB BLD-MCNC: 12.2 G/DL (ref 12–15.9)
HGB UR QL STRIP.AUTO: NEGATIVE
HOLD SPECIMEN: NORMAL
HYALINE CASTS UR QL AUTO: ABNORMAL /LPF
IMM GRANULOCYTES # BLD AUTO: 0.14 10*3/MM3 (ref 0–0.05)
IMM GRANULOCYTES NFR BLD AUTO: 0.9 % (ref 0–0.5)
KETONES UR QL STRIP: NEGATIVE
LEUKOCYTE ESTERASE UR QL STRIP.AUTO: ABNORMAL
LYMPHOCYTES # BLD AUTO: 1.66 10*3/MM3 (ref 0.7–3.1)
LYMPHOCYTES NFR BLD AUTO: 11.2 % (ref 19.6–45.3)
MCH RBC QN AUTO: 29.8 PG (ref 26.6–33)
MCHC RBC AUTO-ENTMCNC: 34 G/DL (ref 31.5–35.7)
MCV RBC AUTO: 87.6 FL (ref 79–97)
MONOCYTES # BLD AUTO: 0.58 10*3/MM3 (ref 0.1–0.9)
MONOCYTES NFR BLD AUTO: 3.9 % (ref 5–12)
NEUTROPHILS NFR BLD AUTO: 12.35 10*3/MM3 (ref 1.7–7)
NEUTROPHILS NFR BLD AUTO: 83.6 % (ref 42.7–76)
NITRITE UR QL STRIP: NEGATIVE
NRBC BLD AUTO-RTO: 0 /100 WBC (ref 0–0.2)
PH UR STRIP.AUTO: 6.5 [PH] (ref 5–9)
PLATELET # BLD AUTO: 315 10*3/MM3 (ref 140–450)
PMV BLD AUTO: 10.5 FL (ref 6–12)
POTASSIUM SERPL-SCNC: 3.7 MMOL/L (ref 3.5–5.2)
PROT SERPL-MCNC: 6.6 G/DL (ref 6–8.5)
PROT UR QL STRIP: NEGATIVE
RBC # BLD AUTO: 4.1 10*6/MM3 (ref 3.77–5.28)
RBC # UR STRIP: ABNORMAL /HPF
REF LAB TEST METHOD: ABNORMAL
SODIUM SERPL-SCNC: 130 MMOL/L (ref 136–145)
SP GR UR STRIP: 1.02 (ref 1–1.03)
SQUAMOUS #/AREA URNS HPF: ABNORMAL /HPF
UROBILINOGEN UR QL STRIP: ABNORMAL
WBC # UR STRIP: ABNORMAL /HPF
WBC NRBC COR # BLD: 14.8 10*3/MM3 (ref 3.4–10.8)
WHOLE BLOOD HOLD SPECIMEN: NORMAL

## 2023-09-29 PROCEDURE — 85025 COMPLETE CBC W/AUTO DIFF WBC: CPT | Performed by: OBSTETRICS & GYNECOLOGY

## 2023-09-29 PROCEDURE — G0379 DIRECT REFER HOSPITAL OBSERV: HCPCS

## 2023-09-29 PROCEDURE — 25010000002 BETAMETHASONE ACET & SOD PHOS PER 4 MG: Performed by: OBSTETRICS & GYNECOLOGY

## 2023-09-29 PROCEDURE — 96372 THER/PROPH/DIAG INJ SC/IM: CPT

## 2023-09-29 PROCEDURE — G0378 HOSPITAL OBSERVATION PER HR: HCPCS

## 2023-09-29 PROCEDURE — 76820 UMBILICAL ARTERY ECHO: CPT

## 2023-09-29 PROCEDURE — 81001 URINALYSIS AUTO W/SCOPE: CPT | Performed by: OBSTETRICS & GYNECOLOGY

## 2023-09-29 PROCEDURE — 80053 COMPREHEN METABOLIC PANEL: CPT | Performed by: OBSTETRICS & GYNECOLOGY

## 2023-09-29 PROCEDURE — 76819 FETAL BIOPHYS PROFIL W/O NST: CPT

## 2023-09-29 RX ORDER — SODIUM CHLORIDE, SODIUM LACTATE, POTASSIUM CHLORIDE, CALCIUM CHLORIDE 600; 310; 30; 20 MG/100ML; MG/100ML; MG/100ML; MG/100ML
125 INJECTION, SOLUTION INTRAVENOUS CONTINUOUS
Status: DISCONTINUED | OUTPATIENT
Start: 2023-09-29 | End: 2023-09-30 | Stop reason: HOSPADM

## 2023-09-29 RX ORDER — ACETAMINOPHEN 325 MG/1
650 TABLET ORAL EVERY 4 HOURS PRN
Status: DISCONTINUED | OUTPATIENT
Start: 2023-09-29 | End: 2023-09-30 | Stop reason: HOSPADM

## 2023-09-29 RX ORDER — SODIUM CHLORIDE 0.9 % (FLUSH) 0.9 %
10 SYRINGE (ML) INJECTION AS NEEDED
Status: DISCONTINUED | OUTPATIENT
Start: 2023-09-29 | End: 2023-09-30 | Stop reason: HOSPADM

## 2023-09-29 RX ORDER — SODIUM CHLORIDE 1 G/1
1 TABLET ORAL
Status: DISCONTINUED | OUTPATIENT
Start: 2023-09-30 | End: 2023-09-30 | Stop reason: HOSPADM

## 2023-09-29 RX ORDER — LIDOCAINE HYDROCHLORIDE 10 MG/ML
0.5 INJECTION, SOLUTION INFILTRATION; PERINEURAL ONCE AS NEEDED
Status: DISCONTINUED | OUTPATIENT
Start: 2023-09-29 | End: 2023-09-30 | Stop reason: HOSPADM

## 2023-09-29 RX ORDER — ONDANSETRON 4 MG/1
8 TABLET, FILM COATED ORAL EVERY 8 HOURS PRN
Status: DISCONTINUED | OUTPATIENT
Start: 2023-09-29 | End: 2023-09-30 | Stop reason: HOSPADM

## 2023-09-29 RX ORDER — CALCIUM CARBONATE 500 MG/1
3 TABLET, CHEWABLE ORAL DAILY PRN
Status: DISCONTINUED | OUTPATIENT
Start: 2023-09-29 | End: 2023-09-30 | Stop reason: HOSPADM

## 2023-09-29 RX ORDER — ONDANSETRON 2 MG/ML
4 INJECTION INTRAMUSCULAR; INTRAVENOUS EVERY 8 HOURS PRN
Status: DISCONTINUED | OUTPATIENT
Start: 2023-09-29 | End: 2023-09-30 | Stop reason: HOSPADM

## 2023-09-29 RX ORDER — DOCUSATE SODIUM 100 MG/1
100 CAPSULE, LIQUID FILLED ORAL 2 TIMES DAILY PRN
Status: DISCONTINUED | OUTPATIENT
Start: 2023-09-29 | End: 2023-09-30 | Stop reason: HOSPADM

## 2023-09-29 RX ORDER — TERBUTALINE SULFATE 1 MG/ML
0.25 INJECTION, SOLUTION SUBCUTANEOUS ONCE
Status: DISCONTINUED | OUTPATIENT
Start: 2023-09-29 | End: 2023-09-30 | Stop reason: HOSPADM

## 2023-09-29 RX ORDER — DIPHENHYDRAMINE HCL 25 MG
12.5 TABLET ORAL EVERY 6 HOURS PRN
Status: DISCONTINUED | OUTPATIENT
Start: 2023-09-29 | End: 2023-09-30 | Stop reason: HOSPADM

## 2023-09-29 RX ORDER — HYDROXYZINE 50 MG/1
50 TABLET, FILM COATED ORAL NIGHTLY PRN
Status: DISCONTINUED | OUTPATIENT
Start: 2023-09-29 | End: 2023-09-30 | Stop reason: HOSPADM

## 2023-09-29 RX ORDER — TERBUTALINE SULFATE 1 MG/ML
0.25 INJECTION, SOLUTION SUBCUTANEOUS ONCE AS NEEDED
Status: DISCONTINUED | OUTPATIENT
Start: 2023-09-29 | End: 2023-09-30 | Stop reason: HOSPADM

## 2023-09-29 RX ORDER — BISACODYL 10 MG
10 SUPPOSITORY, RECTAL RECTAL DAILY PRN
Status: DISCONTINUED | OUTPATIENT
Start: 2023-09-29 | End: 2023-09-30 | Stop reason: HOSPADM

## 2023-09-29 RX ORDER — BETAMETHASONE SODIUM PHOSPHATE AND BETAMETHASONE ACETATE 3; 3 MG/ML; MG/ML
12 INJECTION, SUSPENSION INTRA-ARTICULAR; INTRALESIONAL; INTRAMUSCULAR; SOFT TISSUE EVERY 24 HOURS
Status: COMPLETED | OUTPATIENT
Start: 2023-09-29 | End: 2023-09-30

## 2023-09-29 RX ADMIN — SODIUM CHLORIDE, POTASSIUM CHLORIDE, SODIUM LACTATE AND CALCIUM CHLORIDE 125 ML/HR: 600; 310; 30; 20 INJECTION, SOLUTION INTRAVENOUS at 15:12

## 2023-09-29 RX ADMIN — BETAMETHASONE SODIUM PHOSPHATE AND BETAMETHASONE ACETATE 12 MG: 3; 3 INJECTION, SUSPENSION INTRA-ARTICULAR; INTRALESIONAL; INTRAMUSCULAR at 13:46

## 2023-09-29 RX ADMIN — SODIUM CHLORIDE, POTASSIUM CHLORIDE, SODIUM LACTATE AND CALCIUM CHLORIDE 500 ML: 600; 310; 30; 20 INJECTION, SOLUTION INTRAVENOUS at 14:52

## 2023-09-29 NOTE — PROGRESS NOTES
CC: Prenatal visit    Jennifer Maldonado is a 23 y.o.  at 34w3d.  Doing well.  Denies contractions, LOF, or VB.  Reports good FM.    NST: , 1 accelerations, variables, minimal variability  Not reactive       BP 98/60   Wt 65.3 kg (144 lb)   LMP 2023 (Exact Date)   BMI 23.96 kg/m²   SVE: na     Fetal Heart Rate: 145     Problems (from 23 to present)       Problem Noted Resolved    Poor fetal growth affecting management of mother in curry pregnancy in third trimester 2023 by Mary Song MD No    Maternal care for breech presentation, single gestation 2023 by Mary Song MD No    Anxiety during pregnancy 2023 by Mary Song MD No    Abnormality of uterus during pregnancy in third trimester 2023 by Anh Evans APRN No    Overview Signed 2023  3:25 PM by Anh Evans APRN     - MRI report reviewed with Dr. Song. Notes a bicornis bicollis uterus; uterine cavities have connecting myometrium. 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          Supervision of high risk pregnancy in third trimester 2023 by Anh Evans APRN No            A/P: Jennifer Maldonado is a 23 y.o.  at 34w3d.  - Due to scheduling, no US available. Strip reviewed with Dr. Elizondo. Decision made to send pt to L&D for prolonged NST, BPP with Dopplers.   - RTC Tuesday for BPP and UAD     Diagnosis Plan   1. 34 weeks gestation of pregnancy        2. Abnormality of uterus during pregnancy in third trimester  Fetal Nonstress Test    US fetal biophysical profile no stress    US color flow doppler umbilical artery      3. Supervision of high risk pregnancy in third trimester  US fetal biophysical profile no stress    US color flow doppler umbilical artery      4. Poor fetal growth affecting management of mother in curry pregnancy in third trimester  Fetal Nonstress Test    US  fetal biophysical profile no stress    US color flow doppler umbilical artery      5. Maternal care for breech presentation, single gestation        6. Anxiety during pregnancy          Roslyn Garcia, MOHINI  9/29/2023  11:32 CDT

## 2023-09-30 ENCOUNTER — APPOINTMENT (OUTPATIENT)
Dept: ULTRASOUND IMAGING | Facility: HOSPITAL | Age: 23
End: 2023-09-30
Payer: COMMERCIAL

## 2023-09-30 VITALS
WEIGHT: 144 LBS | TEMPERATURE: 98 F | SYSTOLIC BLOOD PRESSURE: 112 MMHG | OXYGEN SATURATION: 99 % | HEIGHT: 65 IN | BODY MASS INDEX: 23.99 KG/M2 | HEART RATE: 70 BPM | RESPIRATION RATE: 18 BRPM | DIASTOLIC BLOOD PRESSURE: 65 MMHG

## 2023-09-30 PROBLEM — O34.593 ABNORMALITY OF UTERUS DURING PREGNANCY IN THIRD TRIMESTER: Chronic | Status: RESOLVED | Noted: 2023-09-05 | Resolved: 2023-09-30

## 2023-09-30 PROBLEM — O36.5931 IUGR (INTRAUTERINE GROWTH RESTRICTION) AFFECTING CARE OF MOTHER, THIRD TRIMESTER, FETUS 1: Status: ACTIVE | Noted: 2023-09-30

## 2023-09-30 PROBLEM — O34.593 ABNORMALITY OF UTERUS DURING PREGNANCY IN THIRD TRIMESTER: Status: RESOLVED | Noted: 2023-09-05 | Resolved: 2023-09-30

## 2023-09-30 PROBLEM — O36.8390: Status: RESOLVED | Noted: 2023-09-29 | Resolved: 2023-09-30

## 2023-09-30 PROCEDURE — 59025 FETAL NON-STRESS TEST: CPT

## 2023-09-30 PROCEDURE — 96372 THER/PROPH/DIAG INJ SC/IM: CPT

## 2023-09-30 PROCEDURE — G0378 HOSPITAL OBSERVATION PER HR: HCPCS

## 2023-09-30 PROCEDURE — 25010000002 BETAMETHASONE ACET & SOD PHOS PER 4 MG: Performed by: OBSTETRICS & GYNECOLOGY

## 2023-09-30 PROCEDURE — 76820 UMBILICAL ARTERY ECHO: CPT

## 2023-09-30 PROCEDURE — 76819 FETAL BIOPHYS PROFIL W/O NST: CPT

## 2023-09-30 RX ADMIN — BETAMETHASONE SODIUM PHOSPHATE AND BETAMETHASONE ACETATE 12 MG: 3; 3 INJECTION, SUSPENSION INTRA-ARTICULAR; INTRALESIONAL; INTRAMUSCULAR at 14:37

## 2023-09-30 NOTE — DISCHARGE SUMMARY
Date of Discharge:  9/30/2023    Discharge Diagnosis:   IUGR  Abnormality of uterus during pregnancy in third trimester  Abnormality in fetal heart rate/rhythm affecting management of mother- resolved    Problem List:  Active Hospital Problems    Diagnosis  POA    IUGR (intrauterine growth restriction) affecting care of mother, third trimester, fetus 1 [O36.5931]  Yes    Poor fetal growth affecting management of mother in curry pregnancy in third trimester [O36.5930]  Yes    Abnormality of uterus during pregnancy in third trimester [O34.593]  Yes      Resolved Hospital Problems    Diagnosis Date Resolved POA    Abnormality in fetal heart rate/rhythm affecting management of mother [O36.8390] 09/30/2023 Yes       Presenting Problem/History of Present Illness  Abnormality of uterus during pregnancy in third trimester [O34.593]  IUGR (intrauterine growth restriction) affecting care of mother, third trimester, fetus 1 [O36.5931]  Abnormality in fetal heart rate/rhythm affecting management of mother [O36.8390]        Hospital Course  Patient is a 23 y.o. female presented with known IUGR for further evaluation of Nonreactive NST from the office for further evaluation.  A BPP with Umbilical Doppler Studies was obtained at the hospital which was scored as 6/8 with -2 for no Breathing Movements.  Given these findings, the patient was admitted for prolonged fetal monitoring and treated with Betamethasone 12mg x 2 doses given 12 hours apart to promote fetal lung maturation.  She underwent a second BPP with Umbilical Doppler Studies on the day of discharge which was scored as 8/8.  As such, the patient was discharged home in stable condition with instructions to keep her follow up appointment on 10/3/23 as scheduled and return for any complaints.     Procedures Performed    Procedure(s):  BPP with Umbilical Doppler Studies x 2  -------------------       Consults:   Consults       No orders found for last 30 day(s).             Pertinent Test Results:  BPP with Umbilical Doppler Studies    Condition on Discharge:  Stable    Vital Signs  Temp:  [98 °F (36.7 °C)-98.8 °F (37.1 °C)] 98 °F (36.7 °C)  Heart Rate:  [66-70] 70  Resp:  [18] 18  BP: (108-112)/(60-66) 112/65    Discharge Disposition  Home or Self Care    Discharge Medications     Discharge Medications        Continue These Medications        Instructions Start Date   famotidine 20 MG tablet  Commonly known as: PEPCID   1 tablet, Oral, 2 Times Daily      hydrOXYzine pamoate 25 MG capsule  Commonly known as: Vistaril   25 mg, Oral, 3 Times Daily PRN      ondansetron ODT 4 MG disintegrating tablet  Commonly known as: ZOFRAN-ODT   4 mg, Oral      prenatal vitamin 27-0.8 27-0.8 MG tablet tablet   1 tablet, Oral, Daily               Discharge Diet- regular      Activity at Discharge- resume previous activities      Follow-up Appointments  Future Appointments   Date Time Provider Department Center   10/3/2023 10:15 AM Mary Song MD [facility] None   10/17/2023  9:30 AM Anh Evans APRN [facility] None         Test Results Pending at Discharge- none      Yuliet Oakes MD    Time: Discharge 15 min

## 2023-09-30 NOTE — H&P
[facility]  Obstetric History and Physical    Chief Complaint: Non-Reactive NST in office and BPP of 6/8       Subjective     Patient is a 23 y.o. female  currently at 34w3d, who presents with h/o IUGR and now a non-reactive NST in the office for a BPP with Doppler studies at L&D Triage today.  Her BPP was 6/8 with -2 for no Breathing Movements and her NST on L&D Triage showed times of reactivity followed by times of decreased variability with random variables.    Her prenatal care is complicated by her bicornuate and bicollis uterus as well as the fetus being in the Breech presentation.  Her previous obstetric/gynecological history is noted for is remarkable for above noted uterine anomalies..    The following portions of the patients history were reviewed and updated as appropriate: current medications, allergies, past medical history, past surgical history, past family history, past social history, and problem list .       Prenatal Information:  Prenatal Results       Initial Prenatal Labs       Test Value Reference Range Date Time    Hemoglobin ^ 12.3 g/dL 12.0 - 16.0 23 1154       14.0 g/dL 12.0 - 15.9 23 1110    Hematocrit ^ 36.0 % 37.0 - 47.0 23 1154       40.1 % 34.0 - 46.6 23 1110    Platelets ^ 293 K/uL 130 - 400 23 1154       307 10*3/mm3 140 - 450 23 1110    Rubella IgG ^ Reactive   23 1154    Hepatitis B SAg ^ Non-Reactive  Non-reactive 23 1154    Hepatitis C Ab ^ Non-Reactive  Non-Reactive 23 1154       Non-Reactive  Non-Reactive 23 1110    RPR ^ Non-reactive  Non-reactive 23 1154    T. Pallidum Ab         ABO        Rh        Antibody Screen ^ NEG, DOWN TIME   23 1154    HIV        Urine Culture        Gonorrhea  Negative  Negative 10/01/21 0852    Chlamydia  Negative  Negative 10/01/21 0852    TSH  1.430 uIU/mL 0.270 - 4.200 23 1110    HgB A1c   5.00 % 4.80 - 5.60 23 1110    Varicella IgG        HgB  Electrophoresis         Cystic fibrosis                   Fetal testing        Test Value Reference Range Date Time    NIPT        MSAFP        AFP-4                  2nd and 3rd Trimester       Test Value Reference Range Date Time    Hemoglobin (repeated)  12.2 g/dL 12.0 - 15.9 09/29/23 1457      ^ 11.5 g/dL 12.0 - 16.0 08/07/23 1113    Hematocrit (repeated)  35.9 % 34.0 - 46.6 09/29/23 1457      ^ 35.5 % 37.0 - 47.0 08/07/23 1113    Platelets   315 10*3/mm3 140 - 450 09/29/23 1457      ^ 288 K/uL 130 - 400 08/07/23 1113      ^ 293 K/uL 130 - 400 04/17/23 1154       307 10*3/mm3 140 - 450 03/02/23 1110    GCT ^ 80 mg/dL 74 - 180 08/07/23     Antibody Screen (repeated) ^ NEG, DOWN TIME   04/17/23 1154    GTT Fasting        GTT 1 Hr        GTT 2 Hr        GTT 3 Hr        Group B Strep                  Other testing        Test Value Reference Range Date Time    [place] IgG         CMV IgG                   Drug Screening       Test Value Reference Range Date Time    Amphetamine Screen        Barbiturate Screen        Benzodiazepine Screen        Methadone Screen        Phencyclidine Screen        Opiates Screen        THC Screen        Cocaine Screen        Propoxyphene Screen        Buprenorphine Screen        Methamphetamine Screen        Oxycodone Screen        Tricyclic Antidepressants Screen                  Legend    ^: Historical                          External Prenatal Results       Pregnancy Outside Results - Transcribed From Office Records - See Scanned Records For Details       Test Value Date Time    ABO       Rh       Antibody Screen ^ NEG, DOWN TIME  04/17/23 1154    Varicella IgG       Rubella ^ Reactive  04/17/23 1154    Hgb  12.2 g/dL 09/29/23 1457      ^ 11.5 g/dL 08/07/23 1113      ^ 12.3 g/dL 04/17/23 1154       14.0 g/dL 03/02/23 1110    Hct  35.9 % 09/29/23 1457      ^ 35.5 % 08/07/23 1113      ^ 36.0 % 04/17/23 1154       40.1 % 03/02/23 1110    Glucose Fasting GTT       Glucose Tolerance Test  1 hour       Glucose Tolerance Test 3 hour       Gonorrhea (discrete)  Negative  10/01/21 0852    Chlamydia (discrete)  Negative  10/01/21 0852    RPR ^ Non-reactive  23 1154    VDRL       Syphilis Antibody       HBsAg ^ Non-Reactive  23 1154    Herpes Simplex Virus PCR       Herpes Simplex VIrus Culture       HIV       Hep C RNA Quant PCR       Hep C Antibody ^ Non-Reactive  23 1154       Non-Reactive  23 1110    AFP       Group B Strep       GBS Susceptibility to Clindamycin       GBS Susceptibility to Erythromycin       Fetal Fibronectin       Genetic Testing, Maternal Blood                 Drug Screening       Test Value Date Time    Urine Drug Screen       Amphetamine Screen       Barbiturate Screen       Benzodiazepine Screen       Methadone Screen       Phencyclidine Screen       Opiates Screen       THC Screen       Cocaine Screen       Propoxyphene Screen       Buprenorphine Screen       Methamphetamine Screen       Oxycodone Screen       Tricyclic Antidepressants Screen                 Legend    ^: Historical                             Past OB History:     OB History    Para Term  AB Living   1 0 0 0 0 0   SAB IAB Ectopic Molar Multiple Live Births   0 0 0 0 0 0      # Outcome Date GA Lbr Augie/2nd Weight Sex Delivery Anes PTL Lv   1 Current                Past Medical History: Past Medical History:   Diagnosis Date    Anxiety     Depression     Headache     Ovarian cyst       Past Surgical History Past Surgical History:   Procedure Laterality Date    BREAST BIOPSY        Family History: Family History   Problem Relation Age of Onset    No Known Problems Mother     Heart disease Father     Heart failure Father     Hyperlipidemia Father     Hypertension Father     No Known Problems Sister     No Known Problems Brother     No Known Problems Brother     COPD Maternal Grandmother     Aneurysm Maternal Grandmother     Seizures Maternal Grandmother     No Known Problems  Maternal Grandfather     Cancer Paternal Grandmother     Cancer Paternal Grandfather     Hyperlipidemia Paternal Grandfather     Hypertension Paternal Grandfather       Social History:  reports that she has quit smoking. Her smoking use included cigarettes. She has never used smokeless tobacco.   reports that she does not currently use alcohol.   reports that she does not currently use drugs after having used the following drugs: Marijuana.        Review of Systems:                                                    Review of Systems - History obtained from the patient                                                    General ROS: negative for - chills, fatigue, or fever                                                    Psychological ROS: negative for - anxiety or depression                                                    Breast ROS: negative for - galactorrhea, new or changing breast lumps, nipple changes, or nipple d/c                                                    Respiratory ROS: no cough, shortness of breath, or wheezing                                                    Cardiovascular ROS: no chest pain or dyspnea on exertion                                                    Gastrointestinal ROS: negative for - abdominal pain or nausea/vomiting                                                    Extremities ROS: negative for - swelling                                                    Neurological ROS: negative for - headaches or visual changes       Objective     Vital Signs Range for the last 24 hours  Temperature: Temp:  [98.2 °F (36.8 °C)-98.8 °F (37.1 °C)] 98.8 °F (37.1 °C)   Temp Source: Temp src: Oral   BP: BP: ()/(60-71) 111/66   Pulse: Heart Rate:  [63-68] 68   Respirations: Resp:  [18] 18   SPO2: SpO2:  [100 %] 100 %   O2 Amount (l/min):     O2 Devices Device (Oxygen Therapy): room air   Weight: Weight:  [65.3 kg (144 lb)] 65.3 kg (144 lb)     Physical Examination: General appearance  - well developed female, resting in bed in no acute distress  Mental status - alert, oriented to person, place, and time, affect appropriate to mood  Chest - clear to auscultation, no wheezes, rales or rhonchi, symmetric air entry  Heart - normal rate and regular rhythm  Abdomen - Soft, gravid, NT  Fundus- NT  Back exam - No CVA tenderness  Extremities - peripheral pulses normal, no pedal edema, no clubbing or cyanosis      Presentation: Breech   Cervix: Exam by:  Yuliet Oakes MD   Dilation:  Closed    Effacement:  Thick   Station:  High     Fetal Heart Rate Assessment   Method: Fetal HR Assessment Method: external   Beats/min: Fetal HR (beats/min): 135   Baseline: Fetal HR Baseline: normal range   Variability: Fetal HR Variability: moderate (amplitude range 6 to 25 bpm)   Accels: Fetal HR Accelerations: greater than/equal to 15 bpm, lasting at least 15 seconds   Decels: Fetal HR Decelerations: variable   Tracing Category: Fetal HR Tracing Category: Category II     Uterine Assessment   Method: Method: external tocotransducer   Frequency (min): Contraction Frequency (Minutes): 0   Ctx Count in 10 min: Contractions in 10 Minutes: 1   Duration:     Intensity: Contraction Intensity: no contractions   Intensity by IUPC:     Resting Tone: Uterine Resting Tone: soft by palpation   Resting Tone by IUPC:     [place] Units:       Laboratory Results:   WBC   Date Value Ref Range Status   09/29/2023 14.80 (H) 3.40 - 10.80 10*3/mm3 Final     RBC   Date Value Ref Range Status   09/29/2023 4.10 3.77 - 5.28 10*6/mm3 Final     Hemoglobin   Date Value Ref Range Status   09/29/2023 12.2 12.0 - 15.9 g/dL Final     Hematocrit   Date Value Ref Range Status   09/29/2023 35.9 34.0 - 46.6 % Final     MCV   Date Value Ref Range Status   09/29/2023 87.6 79.0 - 97.0 fL Final     MCH   Date Value Ref Range Status   09/29/2023 29.8 26.6 - 33.0 pg Final     MCHC   Date Value Ref Range Status   09/29/2023 34.0 31.5 - 35.7 g/dL Final     RDW    Date Value Ref Range Status   09/29/2023 12.4 12.3 - 15.4 % Final     RDW-SD   Date Value Ref Range Status   09/29/2023 39.8 37.0 - 54.0 fl Final     MPV   Date Value Ref Range Status   09/29/2023 10.5 6.0 - 12.0 fL Final     Platelets   Date Value Ref Range Status   09/29/2023 315 140 - 450 10*3/mm3 Final     Neutrophil %   Date Value Ref Range Status   09/29/2023 83.6 (H) 42.7 - 76.0 % Final     Lymphocyte %   Date Value Ref Range Status   09/29/2023 11.2 (L) 19.6 - 45.3 % Final     Monocyte %   Date Value Ref Range Status   09/29/2023 3.9 (L) 5.0 - 12.0 % Final     Eosinophil %   Date Value Ref Range Status   09/29/2023 0.1 (L) 0.3 - 6.2 % Final     Basophil %   Date Value Ref Range Status   09/29/2023 0.3 0.0 - 1.5 % Final     Immature Grans %   Date Value Ref Range Status   09/29/2023 0.9 (H) 0.0 - 0.5 % Final     Neutrophils, Absolute   Date Value Ref Range Status   09/29/2023 12.35 (H) 1.70 - 7.00 10*3/mm3 Final     Lymphocytes, Absolute   Date Value Ref Range Status   09/29/2023 1.66 0.70 - 3.10 10*3/mm3 Final     Monocytes, Absolute   Date Value Ref Range Status   09/29/2023 0.58 0.10 - 0.90 10*3/mm3 Final     Eosinophils, Absolute   Date Value Ref Range Status   09/29/2023 0.02 0.00 - 0.40 10*3/mm3 Final     Basophils, Absolute   Date Value Ref Range Status   09/29/2023 0.05 0.00 - 0.20 10*3/mm3 Final     Immature Grans, Absolute   Date Value Ref Range Status   09/29/2023 0.14 (H) 0.00 - 0.05 10*3/mm3 Final     nRBC   Date Value Ref Range Status   09/29/2023 0.0 0.0 - 0.2 /100 WBC Final      Glucose   Date Value Ref Range Status   09/29/2023 67 65 - 99 mg/dL Final     Sodium   Date Value Ref Range Status   09/29/2023 130 (L) 136 - 145 mmol/L Final     Potassium   Date Value Ref Range Status   09/29/2023 3.7 3.5 - 5.2 mmol/L Final     CO2   Date Value Ref Range Status   09/29/2023 21.0 (L) 22.0 - 29.0 mmol/L Final     Chloride   Date Value Ref Range Status   09/29/2023 95 (L) 98 - 107 mmol/L Final      Anion Gap   Date Value Ref Range Status   2023 14.0 5.0 - 15.0 mmol/L Final     Creatinine   Date Value Ref Range Status   2023 0.46 (L) 0.57 - 1.00 mg/dL Final     BUN   Date Value Ref Range Status   2023 8 6 - 20 mg/dL Final     BUN/Creatinine Ratio   Date Value Ref Range Status   2023 17.4 7.0 - 25.0 Final     Calcium   Date Value Ref Range Status   2023 8.8 8.6 - 10.5 mg/dL Final     Alkaline Phosphatase   Date Value Ref Range Status   2023 202 (H) 39 - 117 U/L Final     Total Protein   Date Value Ref Range Status   2023 6.6 6.0 - 8.5 g/dL Final     ALT (SGPT)   Date Value Ref Range Status   2023 11 1 - 33 U/L Final     AST (SGOT)   Date Value Ref Range Status   2023 14 1 - 32 U/L Final     Total Bilirubin   Date Value Ref Range Status   2023 0.2 0.0 - 1.2 mg/dL Final     Albumin   Date Value Ref Range Status   2023 3.2 (L) 3.5 - 5.2 g/dL Final     Globulin   Date Value Ref Range Status   2023 3.4 gm/dL Final      Radiology Review: none  Other Studies: none             Assessment & Plan     1. Abnormality in fetal heart rate/rhythm affecting management of mother      2. Poor fetal growth affecting management of mother in curry pregnancy in third trimester     3. Abnormality of uterus during pregnancy in third trimester              Assessment:  1.  24yo  with IUP at 34w3d gestation.    2.  Non reactive NST and BPP of 6/8 with 2 off for no Breathing Movements.  3.  This pregnancy has also been complicated by IUGR and Breech presentation.  4.  GBS status: Unknown at this time.    Plan:  1. Admit to L&D for Observation.  2. Non-reactive NST and 6/8 BPP- will place on continuous monitoring and treat with Betamethasone to promoted fetal lung maturation.  Patient's FHR tracing is reassuring at this time.  3. IUGR and Breech presentation- continue current management.      Yuliet Oakes MD  2023  21:26 CDT

## 2023-09-30 NOTE — DISCHARGE INSTRUCTIONS
Return to labor and delivery for regular contractions every 2-3 mins for 2 hours, if your water breaks, vaginal bleeding, decreased fetal movement.  Keep next scheduled appt and call [phone] for any concerns or problems.

## 2023-10-01 NOTE — NON STRESS TEST
Jennifer Maldonado, a  at 34w4d with an BERAN of 2023, by Ultrasound, was seen at [facility] for a nonstress test.    No chief complaint on file.      Patient Active Problem List   Diagnosis    Fibroadenoma of right breast    Abnormality of uterus during pregnancy in third trimester    Supervision of high risk pregnancy in third trimester    Poor fetal growth affecting management of mother in curry pregnancy in third trimester    Maternal care for breech presentation, single gestation    Anxiety during pregnancy    IUGR (intrauterine growth restriction) affecting care of mother, third trimester, fetus 1       Start Time: 1145  Stop Time: 1215    Interpretation A  Nonstress Test Interpretation A: Reactive  Comments A: strip reviewed withOFELIA Blanton RNC                  FYI:    UDS / Med Count Appointment     UDS: positive for buprenorphine     Medication Count Correct: Yes     Medication Count Details     Total # of medication remaining in bottle: 0  Fill date on bottle: 12/28/18  Medication dispensed on fill date: 14  Number of wrappers in bottle: 14    Summary: Patient presents for a follow up appointment with MAT RN. He immediately reports a slip of oral meth on 1/1/19.  Patient reports he was cleaning his room and found an old jacket. In a pocket was a baggie with a little crystal of meth.  He reports he ate it the crystal and then licked the bag.  Patient reports he did not get any effect from the meth but has been experiencing tremendous guilt and shame since his slip.  He reports that he is glad he did not get an effect and is also relieved that he feels so guilty.  Treatment agreement violation discussed. Patient denies any cravings or withdrawal. He reports that his current dose of Suboxone is effective. He is actively participating in [facility] IOP.

## 2024-02-23 ENCOUNTER — OFFICE VISIT (OUTPATIENT)
Dept: FAMILY MEDICINE CLINIC | Facility: CLINIC | Age: 24
End: 2024-02-23
Payer: COMMERCIAL

## 2024-02-23 VITALS
BODY MASS INDEX: 22.63 KG/M2 | HEIGHT: 65 IN | WEIGHT: 135.8 LBS | RESPIRATION RATE: 18 BRPM | OXYGEN SATURATION: 98 % | HEART RATE: 94 BPM | DIASTOLIC BLOOD PRESSURE: 76 MMHG | TEMPERATURE: 97.5 F | SYSTOLIC BLOOD PRESSURE: 118 MMHG

## 2024-02-23 DIAGNOSIS — R10.10 PAIN OF UPPER ABDOMEN: Primary | ICD-10-CM

## 2024-02-23 NOTE — PROGRESS NOTES
Paz Maldonado is a 23 y.o. female.     History of Present Illness  23-year-old female 4 months post  low transverse incision but 1 month post upper abdominal pain midline and swelling      The following portions of the patient's history were reviewed and updated as appropriate: allergies, current medications, past family history, past medical history, past social history, past surgical history, and problem list.    Review of Systems   Respiratory:  Negative for shortness of breath.    Cardiovascular:  Negative for chest pain and leg swelling.   Gastrointestinal:  Positive for abdominal pain.       Objective   Physical Exam  Vitals and nursing note reviewed.   Constitutional:       Appearance: Normal appearance.   Eyes:      Extraocular Movements: Extraocular movements intact.      Pupils: Pupils are equal, round, and reactive to light.   Pulmonary:      Effort: Pulmonary effort is normal.   Abdominal:      General: Abdomen is flat.      Tenderness: There is abdominal tenderness.      Hernia: A hernia is present.      Comments: McGill between umbilicus and the xiphoid process 2 to 3 cm bulge with a defect consistent with ventral hernia-symptomatic   Musculoskeletal:      Right lower leg: No edema.      Left lower leg: No edema.   Skin:     General: Skin is warm and dry.   Neurological:      General: No focal deficit present.      Mental Status: She is alert and oriented to person, place, and time.   Psychiatric:         Mood and Affect: Mood normal.         Behavior: Behavior normal.         Thought Content: Thought content normal.         Judgment: Judgment normal.         Assessment & Plan   Diagnoses and all orders for this visit:    1. Pain of upper abdomen (Primary)  -     Ambulatory Referral to General Surgery         Plan referral to general surgery [facility] per her request

## 2024-02-29 ENCOUNTER — TELEPHONE (OUTPATIENT)
Dept: FAMILY MEDICINE CLINIC | Facility: CLINIC | Age: 24
End: 2024-02-29
Payer: COMMERCIAL

## 2024-02-29 DIAGNOSIS — K45.8 RECURRENT ABDOMINAL HERNIA WITHOUT OBSTRUCTION OR GANGRENE, UNSPECIFIED HERNIA TYPE: ICD-10-CM

## 2024-02-29 DIAGNOSIS — R10.10 PAIN OF UPPER ABDOMEN: Primary | ICD-10-CM

## 2024-02-29 NOTE — TELEPHONE ENCOUNTER
Caller: Jennifer Maldonado    Relationship: Self    Best call back number: [phone]     What is the medical concern/diagnosis: POSSIBLE HERNIA     What specialty or service is being requested: DIFFERENT SURGEON   BAD EXPERIENCE     What is the provider, practice or medical service name: PROVIDER PREFERENCE         Any additional details: BAD EXPERIENCE WITH THIS SURGEON BEFORE

## 2024-02-29 NOTE — TELEPHONE ENCOUNTER
Dr. David Cole was dismissive to patient telling her he didn't feel a lump there is nothing there and its not hernia.  Said he kept talking over her and would not listen.  Asking for 2nd option in [place] with Lashawn Omalley.  Will change over referral.

## 2024-03-27 ENCOUNTER — OFFICE VISIT (OUTPATIENT)
Dept: SURGERY | Facility: CLINIC | Age: 24
End: 2024-03-27
Payer: COMMERCIAL

## 2024-03-27 VITALS
SYSTOLIC BLOOD PRESSURE: 122 MMHG | HEART RATE: 91 BPM | HEIGHT: 65 IN | DIASTOLIC BLOOD PRESSURE: 62 MMHG | OXYGEN SATURATION: 99 % | WEIGHT: 134.2 LBS | BODY MASS INDEX: 22.36 KG/M2 | RESPIRATION RATE: 16 BRPM

## 2024-03-27 DIAGNOSIS — R10.33 PERIUMBILICAL ABDOMINAL PAIN: ICD-10-CM

## 2024-03-27 DIAGNOSIS — R19.00 ABDOMINAL WALL BULGE: Primary | ICD-10-CM

## 2024-03-27 RX ORDER — NORELGESTROMIN AND ETHINYL ESTRADIOL 35; 150 UG/MG; UG/MG
1 PATCH TRANSDERMAL
COMMUNITY
Start: 2024-03-04 | End: 2025-03-05

## 2024-03-27 NOTE — PROGRESS NOTES
Office New Patient History and Physical:     Referring Provider: Han Enriquez,*    Chief Complaint   Patient presents with    Follow-up     Patient here for evaluation of abdominal hernia /diastasis of rectum        Subjective .     History of present illness:  Jennifer Maldonado is a 23 y.o. female who presents to the clinic for evaluation of a possible hernia vs. diastasis. She states that she went to see her family doctor and he said he wasn't sure if it was a hernia or diastasis so he would send her to see general surgery in [place]. The patient states that the doctor who she saw was very dismissive and talked over her and she did not have a good experience. She is here today for a second opinion. The patient states that about 3 months ago, she moved wrong in bed and then a few days later noticed bulge above her belly button. She states that she notices the bulge all the time. She endorses pain in certain positions, like when she is bent over or when she is holding her baby up against her abdomen. She describes the pain as a discomfort. [facility] includes a  in 2023. She denies nausea, vomiting, or reflux symptoms. She denies problems with her bowels. She does not feel like she it has gotten bigger. She has not had any imaging done.     BMI is 22.33. She is an every day vape user. She does not take any blood thinners.     Review of Systems    Review of Systems - General ROS: negative  ENT ROS: negative  Respiratory ROS: no cough, shortness of breath, or wheezing  Cardiovascular ROS: no chest pain or dyspnea on exertion  Gastrointestinal ROS: positive for - abdominal pain  Genito-Urinary ROS: no dysuria, trouble voiding, or hematuria  Dermatological ROS: negative   Breast ROS: negative for breast lumps  Hematological and Lymphatic ROS: negative  Musculoskeletal ROS: negative   Neurological ROS: no TIA or stroke symptoms    Psychological ROS: negative  Endocrine ROS: negative    History  Past  "Medical History:   Diagnosis Date    Anxiety     Depression     Headache     Ovarian cyst    ,   Past Surgical History:   Procedure Laterality Date    BREAST BIOPSY     ,   Family History   Problem Relation Age of Onset    No Known Problems Mother     Heart disease Father     Heart failure Father     Hyperlipidemia Father     Hypertension Father     No Known Problems Sister     No Known Problems Brother     No Known Problems Brother     COPD Maternal Grandmother     Aneurysm Maternal Grandmother     Seizures Maternal Grandmother     No Known Problems Maternal Grandfather     Cancer Paternal Grandmother     Cancer Paternal Grandfather     Hyperlipidemia Paternal Grandfather     Hypertension Paternal Grandfather    ,   Social History     Tobacco Use    Smoking status: Never    Smokeless tobacco: Never   Vaping Use    Vaping status: Former    Quit date: 3/3/2023    Substances: Nicotine    Devices: Disposable   Substance Use Topics    Alcohol use: Not Currently    Drug use: Not Currently     Types: Marijuana   , (Not in a hospital admission)   and Allergies:  Patient has no known allergies.    Current Outpatient Medications:     norelgestromin-ethinyl estradiol (ORTHO EVRA) 150-35 MCG/24HR, 1 patch., Disp: , Rfl:     Objective     Vital Signs   /62 (BP Location: Left arm, Patient Position: Sitting, Cuff Size: Adult)   Pulse 91   Resp 16   Ht 165.1 cm (65\")   Wt 60.9 kg (134 lb 3.2 oz)   SpO2 99%   BMI 22.33 kg/m²      Physical Exam:  General appearance - alert, well appearing, and in no distress, oriented to person, place, and time, and normal appearing weight  Mental status - alert, oriented to person, place, and time, normal mood, behavior, speech, dress, motor activity, and thought processes  Eyes - pupils equal and reactive, extraocular eye movements intact, sclera anicteric  Neck - supple, no significant adenopathy  Chest - no tachypnea, retractions or cyanosis  Heart - normal rate and regular " rhythm  Abdomen - soft, nondistended, tenderness noted above the umbilicus, approximately 3 cm round area of distortion approximately 5cm above the umbilicus, no erythema or skin changes overlying the area  no hernias noted  Neurological - alert, oriented, normal speech, no focal findings or movement disorder noted  Skin - normal coloration and turgor, no rashes, no suspicious skin lesions noted    Results Review:  Result Review :            Assessment & Plan       Diagnoses and all orders for this visit:    1. Abdominal wall bulge (Primary)  -     US Soft Tissue; Future    2. Periumbilical abdominal pain     Ms. Maldonado is a 24 y/o female who presents to the clinic for second opinion of a possible hernia vs. Diastasis. I had Dr. Omalley take a look at her as well and neither of us appreciate a hernia. As she has not had any imaging done, I have ordered an U/S to evaluate the area. I will call her with the results and next steps. She will call for an appointment if she has any new problems or concerns. She voiced understanding and is agreeable to the plan.     Follow up:     BMI is within normal parameters. No other follow-up for BMI required.    Return if symptoms worsen or fail to improve.        Pia Cohn PA-C  03/27/24  15:47 CDT

## 2024-04-12 ENCOUNTER — HOSPITAL ENCOUNTER (OUTPATIENT)
Dept: ULTRASOUND IMAGING | Facility: HOSPITAL | Age: 24
Discharge: HOME | End: 2024-04-12
Payer: COMMERCIAL

## 2024-04-12 DIAGNOSIS — R19.00 ABDOMINAL WALL BULGE: ICD-10-CM

## 2024-04-12 PROCEDURE — 76999 ECHO EXAMINATION PROCEDURE: CPT

## 2024-04-15 DIAGNOSIS — R19.00 ABDOMINAL WALL BULGE: Primary | ICD-10-CM

## 2024-04-15 NOTE — PROGRESS NOTES
Patient called and made aware of results. She would like to have the CT done as she continues to have symptoms. I have placed the order for this. She will follow up in office in 2 weeks to go over the results.

## 2024-04-24 ENCOUNTER — HOSPITAL ENCOUNTER (OUTPATIENT)
Dept: CT IMAGING | Facility: HOSPITAL | Age: 24
Discharge: HOME | End: 2024-04-24
Payer: COMMERCIAL

## 2024-04-24 DIAGNOSIS — R19.00 ABDOMINAL WALL BULGE: ICD-10-CM

## 2024-04-24 LAB — CREAT BLDA-MCNC: 0.8 MG/DL (ref 0.6–1.3)

## 2024-04-24 PROCEDURE — 82565 ASSAY OF CREATININE: CPT

## 2024-04-24 PROCEDURE — 25510000001 IOPAMIDOL 61 % SOLUTION

## 2024-04-24 PROCEDURE — 74177 CT ABD & PELVIS W/CONTRAST: CPT

## 2024-04-24 RX ADMIN — IOPAMIDOL 100 ML: 612 INJECTION, SOLUTION INTRAVENOUS at 10:27

## 2024-04-29 ENCOUNTER — OFFICE VISIT (OUTPATIENT)
Dept: SURGERY | Facility: CLINIC | Age: 24
End: 2024-04-29
Payer: COMMERCIAL

## 2024-04-29 VITALS
WEIGHT: 133.6 LBS | OXYGEN SATURATION: 98 % | HEART RATE: 82 BPM | HEIGHT: 65 IN | BODY MASS INDEX: 22.26 KG/M2 | SYSTOLIC BLOOD PRESSURE: 119 MMHG | DIASTOLIC BLOOD PRESSURE: 80 MMHG

## 2024-04-29 DIAGNOSIS — M62.08 DIASTASIS RECTI: Primary | ICD-10-CM

## 2024-04-29 PROCEDURE — 99213 OFFICE O/P EST LOW 20 MIN: CPT

## 2024-05-01 NOTE — PROGRESS NOTES
Office Established Patient Note:     Referring Provider: No ref. provider found    Chief Complaint   Patient presents with    Follow-up     Patient here for follow up after ct scan hernia        Subjective .     History of present illness:  Jennifer Maldonado is a 23 y.o. female who presents to the clinic for follow-up after abdominal CT scan to evaluate for possible hernia/evaluation of palpable abdominal bulge.  She is overall doing well and has no new complaints.  She continues to endorse a bulge that is sometimes painful.    BMI is 22.23.  She is a non-smoker.  She does not take any blood thinners.    Review of Systems    Review of Systems - General ROS: negative  ENT ROS: negative  Respiratory ROS: no cough, shortness of breath, or wheezing  Cardiovascular ROS: no chest pain or dyspnea on exertion  Gastrointestinal ROS: no abdominal pain, change in bowel habits, or black or bloody stools  Genito-Urinary ROS: no dysuria, trouble voiding, or hematuria  Dermatological ROS: negative   Breast ROS: negative for breast lumps  Hematological and Lymphatic ROS: negative  Musculoskeletal ROS: negative   Neurological ROS: no TIA or stroke symptoms    Psychological ROS: negative  Endocrine ROS: negative    History  Past Medical History:   Diagnosis Date    Anxiety     Depression     Headache     Ovarian cyst    ,   Past Surgical History:   Procedure Laterality Date    BREAST BIOPSY     ,   Family History   Problem Relation Age of Onset    No Known Problems Mother     Heart disease Father     Heart failure Father     Hyperlipidemia Father     Hypertension Father     No Known Problems Sister     No Known Problems Brother     No Known Problems Brother     COPD Maternal Grandmother     Aneurysm Maternal Grandmother     Seizures Maternal Grandmother     No Known Problems Maternal Grandfather     Cancer Paternal Grandmother     Cancer Paternal Grandfather     Hyperlipidemia Paternal Grandfather     Hypertension Paternal Grandfather   "  ,   Social History     Tobacco Use    Smoking status: Never    Smokeless tobacco: Never   Vaping Use    Vaping status: Former    Quit date: 3/3/2023    Substances: Nicotine    Devices: Disposable   Substance Use Topics    Alcohol use: Not Currently    Drug use: Not Currently     Types: Marijuana   , (Not in a hospital admission)   and Allergies:  Patient has no known allergies.    Current Outpatient Medications:     norelgestromin-ethinyl estradiol (ORTHO EVRA) 150-35 MCG/24HR, 1 patch., Disp: , Rfl:     Objective     Vital Signs   /80 (BP Location: Left arm, Patient Position: Sitting, Cuff Size: Adult)   Pulse 82   Ht 165.1 cm (65\")   Wt 60.6 kg (133 lb 9.6 oz)   SpO2 98%   BMI 22.23 kg/m²      Physical Exam:  General appearance - alert, well appearing, and in no distress and oriented to person, place, and time  Mental status - alert, oriented to person, place, and time, normal mood, behavior, speech, dress, motor activity, and thought processes  Eyes - sclera anicteric  Neck - supple, no significant adenopathy  Chest - no tachypnea, retractions or cyanosis  Heart - normal rate and regular rhythm  Abdomen - approximately 3 cm round area of distortion approximately 5cm above the umbilicus  Neurological - alert, oriented, normal speech, no focal findings or movement disorder noted  Skin - normal coloration and turgor, no rashes, no suspicious skin lesions noted    Results Review:  Result Review :            CT Abdomen Pelvis With Contrast (04/24/2024 10:26)   IMPRESSION:  1. No acute abnormality identified.  2. No ventral wall hernia identified. Subcutaneous soft tissues in the  region of the palpable abnormality are unremarkable.    Assessment & Plan       Diagnoses and all orders for this visit:    1. Diastasis recti (Primary)  -     Ambulatory Referral to Physical Therapy Evaluate and treat (Diastasis recti)    Ms. Maldonado is a 23-year-old female who presents to the clinic for follow-up after abdominal " CT scan to evaluate for causes of palpable abdominal wall bulge.  CT scan showed no hernia as well as no concerns for soft tissue masses in the area of palpable concern.  I reviewed the patient's CT scan with Dr. Omalley who states that the patient does have some diastasis.  It has been recommended for the patient to see physical therapy to work on abdominal wall strengthening exercises.  I have placed a referral for this.  The patient states she would like to do this closer to home near [place].  At this time, she may call for any new problems or concerns for an appointment.  She voiced understanding of this and is agreeable to the plan.    Follow up:     BMI is within normal parameters. No other follow-up for BMI required.    Return if symptoms worsen or fail to improve.        Pia Cohn PA-C  05/01/24  09:51 CDT

## 2024-08-28 ENCOUNTER — OFFICE VISIT (OUTPATIENT)
Dept: FAMILY MEDICINE CLINIC | Facility: CLINIC | Age: 24
End: 2024-08-28
Payer: COMMERCIAL

## 2024-08-28 VITALS
BODY MASS INDEX: 21.39 KG/M2 | SYSTOLIC BLOOD PRESSURE: 116 MMHG | OXYGEN SATURATION: 97 % | HEART RATE: 100 BPM | RESPIRATION RATE: 18 BRPM | HEIGHT: 65 IN | DIASTOLIC BLOOD PRESSURE: 70 MMHG | WEIGHT: 128.4 LBS | TEMPERATURE: 97.6 F
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
## 2024-08-28 DIAGNOSIS — M54.6 CHRONIC THORACIC BACK PAIN, UNSPECIFIED BACK PAIN LATERALITY: Primary | ICD-10-CM

## 2024-08-28 DIAGNOSIS — G89.29 CHRONIC THORACIC BACK PAIN, UNSPECIFIED BACK PAIN LATERALITY: Primary | ICD-10-CM

## 2024-08-28 PROCEDURE — 99213 OFFICE O/P EST LOW 20 MIN: CPT | Performed by: FAMILY MEDICINE
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
## 2024-08-30 ENCOUNTER — TELEPHONE (OUTPATIENT)
Dept: FAMILY MEDICINE CLINIC | Facility: CLINIC | Age: 24
End: 2024-08-30

## 2024-08-30 NOTE — TELEPHONE ENCOUNTER
Caller: Jennifer Maldonado    Relationship: Self    Best call back number: [phone]    Caller requesting test results: SELF     What test was performed: X RAYS OF SPINE     When was the test performed: WEDNESDAY     Where was the test performed: [facility]

## 2024-10-02 ENCOUNTER — OFFICE VISIT (OUTPATIENT)
Dept: FAMILY MEDICINE CLINIC | Facility: CLINIC | Age: 24
End: 2024-10-02
Payer: COMMERCIAL

## 2024-10-02 VITALS
OXYGEN SATURATION: 98 % | RESPIRATION RATE: 18 BRPM | HEART RATE: 98 BPM | BODY MASS INDEX: 20.79 KG/M2 | WEIGHT: 124.8 LBS | HEIGHT: 65 IN | DIASTOLIC BLOOD PRESSURE: 70 MMHG | TEMPERATURE: 97.5 F | SYSTOLIC BLOOD PRESSURE: 118 MMHG

## 2024-10-02 DIAGNOSIS — M54.9 ACUTE BILATERAL BACK PAIN, UNSPECIFIED BACK LOCATION: Primary | ICD-10-CM
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
## 2024-10-02 DIAGNOSIS — M62.838 MUSCLE SPASM: ICD-10-CM

## 2024-10-02 PROCEDURE — 99213 OFFICE O/P EST LOW 20 MIN: CPT | Performed by: FAMILY MEDICINE

## 2024-10-02 RX ORDER — METHOCARBAMOL 500 MG/1
TABLET, FILM COATED ORAL
Qty: 60 TABLET | Refills: 2 | Status: SHIPPED | OUTPATIENT
Start: 2024-10-02
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
## 2024-10-30 ENCOUNTER — OFFICE VISIT (OUTPATIENT)
Dept: FAMILY MEDICINE CLINIC | Facility: CLINIC | Age: 24
End: 2024-10-30
Payer: COMMERCIAL

## 2024-10-30 VITALS
SYSTOLIC BLOOD PRESSURE: 120 MMHG | WEIGHT: 125 LBS | HEART RATE: 100 BPM | DIASTOLIC BLOOD PRESSURE: 76 MMHG | TEMPERATURE: 97.2 F | HEIGHT: 65 IN | BODY MASS INDEX: 20.83 KG/M2 | RESPIRATION RATE: 16 BRPM | OXYGEN SATURATION: 98 %

## 2024-10-30 DIAGNOSIS — G89.29 CHRONIC NECK AND BACK PAIN: Primary | ICD-10-CM

## 2024-10-30 DIAGNOSIS — M54.9 CHRONIC NECK AND BACK PAIN: Primary | ICD-10-CM

## 2024-10-30 DIAGNOSIS — M54.2 CHRONIC NECK AND BACK PAIN: Primary | ICD-10-CM

## 2024-10-30 PROCEDURE — 99213 OFFICE O/P EST LOW 20 MIN: CPT | Performed by: FAMILY MEDICINE
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
## 2024-11-19 ENCOUNTER — TELEPHONE (OUTPATIENT)
Dept: FAMILY MEDICINE CLINIC | Facility: CLINIC | Age: 24
End: 2024-11-19
Payer: COMMERCIAL

## 2024-11-19 NOTE — TELEPHONE ENCOUNTER
Caller: Jennifer Maldonado    Relationship: Self    Best call back number:      [phone]       What is the medical concern/diagnosis: NECK    What specialty or service is being requested: ORTHOPEDICS    [facility] DO[facility] NOT 'DO' NEC[facility].  NEEDS A REFERRAL TO ANOTHER PLACE.  PL.EASE CALL PATIENT WITH ADVISEMENT.

## 2024-12-02 ENCOUNTER — TELEPHONE (OUTPATIENT)
Dept: FAMILY MEDICINE CLINIC | Facility: CLINIC | Age: 24
End: 2024-12-02
Payer: COMMERCIAL
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
## 2024-12-02 DIAGNOSIS — M54.9 CHRONIC NECK AND BACK PAIN: Primary | ICD-10-CM

## 2024-12-02 DIAGNOSIS — M54.2 CHRONIC NECK AND BACK PAIN: Primary | ICD-10-CM

## 2024-12-02 DIAGNOSIS — G89.29 CHRONIC NECK AND BACK PAIN: Primary | ICD-10-CM

## 2024-12-02 NOTE — TELEPHONE ENCOUNTER
Caller: Jennifer Maldonado    Relationship: Self    Best call back number: [phone]     What is the best time to reach you: ANYTIME    Who are you requesting to speak with (clinical staff, provider,  specific staff member): PROVIDER OR NURSE    What was the call regarding: PATIENT STATED SHE CALLED THE PLACE IN [place] THAT SHE WAS REFERRED IN REGARD TO HER NECK AND WAS ADVISED THEY DO NOT LOOK AT NECKS.    PLEASE CALL TO DISCUSS AND ADVISE.

## 2024-12-09 NOTE — TELEPHONE ENCOUNTER
Caller: Jennifer Maldonado    Relationship: Self    Best call back number: [phone]    What is the best time to reach you: ANY    Who are you requesting to speak with (clinical staff, provider,  specific staff member): NONE SPECIFIED     What was the call regarding:     PATIENT WOULD LIKE TO CHECK ON THE STATUS OF HER REFERRAL FOR HER NECK ISSUES.     PATIENT IS ALSO WONDERING IF AN MRI WOULD BE A BETTER OPTION.     Is it okay if the provider responds through MyChart: PLEASE CALL

## 2024-12-11 NOTE — TELEPHONE ENCOUNTER
Pt stopped by office since has heard anything, yet.  Can someone advise what to tell patient? She mentioned getting an MRI?

## 2024-12-26 DIAGNOSIS — G89.21 CHRONIC PAIN AFTER WHIPLASH INJURY TO NECK: ICD-10-CM
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
## 2024-12-26 DIAGNOSIS — S19.80XS CHRONIC PAIN AFTER WHIPLASH INJURY TO NECK: ICD-10-CM

## 2025-02-06 ENCOUNTER — OFFICE VISIT (OUTPATIENT)
Dept: NEUROSURGERY | Facility: CLINIC | Age: 25
End: 2025-02-06
Payer: COMMERCIAL

## 2025-02-06 VITALS — BODY MASS INDEX: 21.33 KG/M2 | WEIGHT: 128 LBS | HEIGHT: 65 IN

## 2025-02-06 DIAGNOSIS — Z78.9 NONSMOKER: ICD-10-CM

## 2025-02-06 DIAGNOSIS — M47.812 CERVICAL SPONDYLOSIS WITHOUT MYELOPATHY: Primary | ICD-10-CM

## 2025-02-06 RX ORDER — METHYLPREDNISOLONE 4 MG/1
TABLET ORAL
Qty: 21 TABLET | Refills: 1 | Status: SHIPPED | OUTPATIENT
Start: 2025-02-06

## 2025-02-06 RX ORDER — CYCLOBENZAPRINE HCL 10 MG
TABLET ORAL
Qty: 30 TABLET | Refills: 0 | Status: SHIPPED | OUTPATIENT
Start: 2025-02-06

## 2025-02-06 RX ORDER — MELOXICAM 15 MG/1
15 TABLET ORAL DAILY
Qty: 30 TABLET | Refills: 0 | Status: SHIPPED | OUTPATIENT
Start: 2025-02-06
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
## 2025-02-06 NOTE — PATIENT INSTRUCTIONS
You have been referred to [facility] for trial of injections  Stop Robaxin and try Flexeril at bedtime as needed for muscle spasms.  Steroid pack has been sent to the pharmacy.  When you complete the steroid, start meloxicam.  Both of these medications are for inflammation  Avoid aggravating activities and utilize protective body mechanics.  Call the office if you have any worsening pain or other issues or concerns before next appointment.

## 2025-02-07 ENCOUNTER — PATIENT ROUNDING (BHMG ONLY) (OUTPATIENT)
Dept: NEUROSURGERY | Facility: CLINIC | Age: 25
End: 2025-02-07
Payer: COMMERCIAL

## 2025-02-07 NOTE — PROGRESS NOTES
A My-Chart message has been sent to the patient for PATIENT ROUNDING with [facility].